# Patient Record
Sex: MALE | Race: WHITE | Employment: UNEMPLOYED | URBAN - METROPOLITAN AREA
[De-identification: names, ages, dates, MRNs, and addresses within clinical notes are randomized per-mention and may not be internally consistent; named-entity substitution may affect disease eponyms.]

---

## 2017-01-09 ENCOUNTER — OFFICE VISIT (OUTPATIENT)
Dept: FAMILY MEDICINE CLINIC | Age: 62
End: 2017-01-09

## 2017-01-09 VITALS
OXYGEN SATURATION: 99 % | HEIGHT: 69 IN | RESPIRATION RATE: 16 BRPM | WEIGHT: 188.6 LBS | BODY MASS INDEX: 27.93 KG/M2 | SYSTOLIC BLOOD PRESSURE: 100 MMHG | TEMPERATURE: 97.3 F | HEART RATE: 72 BPM | DIASTOLIC BLOOD PRESSURE: 28 MMHG

## 2017-01-09 DIAGNOSIS — I10 ESSENTIAL HYPERTENSION: ICD-10-CM

## 2017-01-09 DIAGNOSIS — M54.42 CHRONIC MIDLINE LOW BACK PAIN WITH LEFT-SIDED SCIATICA: ICD-10-CM

## 2017-01-09 DIAGNOSIS — E78.2 MIXED HYPERLIPIDEMIA: ICD-10-CM

## 2017-01-09 DIAGNOSIS — E11.42 DIABETIC POLYNEUROPATHY ASSOCIATED WITH TYPE 2 DIABETES MELLITUS (HCC): ICD-10-CM

## 2017-01-09 DIAGNOSIS — E11.65 TYPE 2 DIABETES MELLITUS WITH HYPERGLYCEMIA, WITH LONG-TERM CURRENT USE OF INSULIN (HCC): ICD-10-CM

## 2017-01-09 DIAGNOSIS — Z95.5 PRESENCE OF STENT IN CORONARY ARTERY: ICD-10-CM

## 2017-01-09 DIAGNOSIS — E78.5 HYPERLIPIDEMIA, UNSPECIFIED HYPERLIPIDEMIA TYPE: ICD-10-CM

## 2017-01-09 DIAGNOSIS — B18.1 CHRONIC VIRAL HEPATITIS B WITHOUT DELTA AGENT AND WITHOUT COMA (HCC): ICD-10-CM

## 2017-01-09 DIAGNOSIS — G89.29 CHRONIC MIDLINE LOW BACK PAIN WITH LEFT-SIDED SCIATICA: ICD-10-CM

## 2017-01-09 DIAGNOSIS — I25.10 CORONARY ARTERY DISEASE INVOLVING NATIVE CORONARY ARTERY OF NATIVE HEART WITHOUT ANGINA PECTORIS: Primary | ICD-10-CM

## 2017-01-09 DIAGNOSIS — F41.9 ANXIETY: ICD-10-CM

## 2017-01-09 DIAGNOSIS — K21.9 GASTROESOPHAGEAL REFLUX DISEASE, ESOPHAGITIS PRESENCE NOT SPECIFIED: ICD-10-CM

## 2017-01-09 DIAGNOSIS — Z79.4 TYPE 2 DIABETES MELLITUS WITH HYPERGLYCEMIA, WITH LONG-TERM CURRENT USE OF INSULIN (HCC): ICD-10-CM

## 2017-01-09 LAB
ALBUMIN SERPL-MCNC: 4.5 G/DL (ref 3.9–4.9)
ALP BLD-CCNC: 54 U/L (ref 35–104)
ALT SERPL-CCNC: 31 U/L (ref 0–41)
ANION GAP SERPL CALCULATED.3IONS-SCNC: 11 MEQ/L (ref 7–13)
AST SERPL-CCNC: 19 U/L (ref 0–40)
BASOPHILS ABSOLUTE: 0 K/UL (ref 0–0.2)
BASOPHILS RELATIVE PERCENT: 0.3 %
BILIRUB SERPL-MCNC: 0.7 MG/DL (ref 0–1.2)
BUN BLDV-MCNC: 22 MG/DL (ref 6–20)
CALCIUM SERPL-MCNC: 9.8 MG/DL (ref 8.6–10.2)
CHLORIDE BLD-SCNC: 97 MEQ/L (ref 98–107)
CHOLESTEROL, TOTAL: 148 MG/DL (ref 0–199)
CO2: 27 MEQ/L (ref 22–29)
CREAT SERPL-MCNC: 0.95 MG/DL (ref 0.7–1.2)
EOSINOPHILS ABSOLUTE: 0.1 K/UL (ref 0–0.7)
EOSINOPHILS RELATIVE PERCENT: 1.4 %
GFR AFRICAN AMERICAN: >60
GFR NON-AFRICAN AMERICAN: >60
GLOBULIN: 2.5 G/DL (ref 2.3–3.5)
GLUCOSE BLD-MCNC: 267 MG/DL (ref 74–109)
HBA1C MFR BLD: 8.5 % (ref 4.8–5.9)
HBV SURFACE AB TITR SER: REACTIVE MIU/ML
HCT VFR BLD CALC: 46.4 % (ref 42–52)
HDLC SERPL-MCNC: 39 MG/DL (ref 40–59)
HEMOGLOBIN: 15.6 G/DL (ref 14–18)
LDL CHOLESTEROL CALCULATED: 62 MG/DL (ref 0–129)
LYMPHOCYTES ABSOLUTE: 1.6 K/UL (ref 1–4.8)
LYMPHOCYTES RELATIVE PERCENT: 16.6 %
MCH RBC QN AUTO: 32.3 PG (ref 27–31.3)
MCHC RBC AUTO-ENTMCNC: 33.7 % (ref 33–37)
MCV RBC AUTO: 95.9 FL (ref 80–100)
MONOCYTES ABSOLUTE: 0.7 K/UL (ref 0.2–0.8)
MONOCYTES RELATIVE PERCENT: 7.8 %
NEUTROPHILS ABSOLUTE: 6.9 K/UL (ref 1.4–6.5)
NEUTROPHILS RELATIVE PERCENT: 73.9 %
PDW BLD-RTO: 13.3 % (ref 11.5–14.5)
PLATELET # BLD: 182 K/UL (ref 130–400)
POTASSIUM SERPL-SCNC: 5.2 MEQ/L (ref 3.5–5.1)
RBC # BLD: 4.84 M/UL (ref 4.7–6.1)
SODIUM BLD-SCNC: 135 MEQ/L (ref 132–144)
TOTAL PROTEIN: 7 G/DL (ref 6.4–8.1)
TRIGL SERPL-MCNC: 233 MG/DL (ref 0–200)
WBC # BLD: 9.4 K/UL (ref 4.8–10.8)

## 2017-01-09 PROCEDURE — 99214 OFFICE O/P EST MOD 30 MIN: CPT | Performed by: FAMILY MEDICINE

## 2017-01-09 RX ORDER — GLIPIZIDE 10 MG/1
10 TABLET ORAL
Qty: 60 TABLET | Refills: 5 | Status: SHIPPED | OUTPATIENT
Start: 2017-01-09 | End: 2017-05-03 | Stop reason: SDUPTHER

## 2017-01-09 RX ORDER — GLUCOSAMINE HCL/CHONDROITIN SU 500-400 MG
CAPSULE ORAL
Qty: 100 STRIP | Refills: 5 | Status: SHIPPED | OUTPATIENT
Start: 2017-01-09 | End: 2017-08-24 | Stop reason: SDUPTHER

## 2017-01-09 RX ORDER — LANCETS 30 GAUGE
EACH MISCELLANEOUS
Qty: 100 EACH | Refills: 5 | Status: SHIPPED | OUTPATIENT
Start: 2017-01-09 | End: 2018-11-08

## 2017-01-09 RX ORDER — METOPROLOL SUCCINATE 25 MG/1
12.5 TABLET, EXTENDED RELEASE ORAL DAILY
Qty: 30 TABLET | Refills: 5 | Status: SHIPPED | OUTPATIENT
Start: 2017-01-09 | End: 2018-09-13 | Stop reason: DRUGHIGH

## 2017-01-09 RX ORDER — RANOLAZINE 500 MG/1
1000 TABLET, EXTENDED RELEASE ORAL 2 TIMES DAILY
Qty: 60 TABLET | Refills: 5 | Status: SHIPPED | OUTPATIENT
Start: 2017-01-09 | End: 2017-01-12 | Stop reason: DRUGHIGH

## 2017-01-09 RX ORDER — PRASUGREL 10 MG/1
10 TABLET, FILM COATED ORAL ONCE
Qty: 30 TABLET | Refills: 5 | Status: SHIPPED | OUTPATIENT
Start: 2017-01-09 | End: 2018-09-13 | Stop reason: ALTCHOICE

## 2017-01-09 RX ORDER — CANAGLIFLOZIN 100 MG/1
100 TABLET, FILM COATED ORAL
Qty: 30 TABLET | Refills: 11 | Status: SHIPPED | OUTPATIENT
Start: 2017-01-09 | End: 2017-04-11 | Stop reason: CLARIF

## 2017-01-09 RX ORDER — PANTOPRAZOLE SODIUM 20 MG/1
20 TABLET, DELAYED RELEASE ORAL DAILY
Qty: 30 TABLET | Refills: 5 | Status: SHIPPED | OUTPATIENT
Start: 2017-01-09 | End: 2017-05-03 | Stop reason: SDUPTHER

## 2017-01-09 RX ORDER — GABAPENTIN 300 MG/1
300 CAPSULE ORAL 3 TIMES DAILY
Qty: 90 CAPSULE | Refills: 5 | Status: SHIPPED | OUTPATIENT
Start: 2017-01-09 | End: 2017-05-03 | Stop reason: SDUPTHER

## 2017-01-09 RX ORDER — CANAGLIFLOZIN 100 MG/1
TABLET, FILM COATED ORAL
Refills: 11 | COMMUNITY
Start: 2016-11-06 | End: 2017-01-09 | Stop reason: SDUPTHER

## 2017-01-09 RX ORDER — ATORVASTATIN CALCIUM 80 MG/1
80 TABLET, FILM COATED ORAL DAILY
Qty: 30 TABLET | Refills: 5 | Status: SHIPPED | OUTPATIENT
Start: 2017-01-09 | End: 2017-05-03 | Stop reason: SDUPTHER

## 2017-01-09 RX ORDER — RANOLAZINE 500 MG/1
1000 TABLET, EXTENDED RELEASE ORAL 2 TIMES DAILY
Qty: 60 TABLET | Refills: 5 | Status: SHIPPED | OUTPATIENT
Start: 2017-01-09 | End: 2017-01-09 | Stop reason: SDUPTHER

## 2017-01-09 RX ORDER — RANOLAZINE 500 MG/1
1000 TABLET, EXTENDED RELEASE ORAL 2 TIMES DAILY
Qty: 120 TABLET | Refills: 5 | Status: CANCELLED | OUTPATIENT
Start: 2017-01-09

## 2017-01-09 RX ORDER — SERTRALINE HYDROCHLORIDE 100 MG/1
100 TABLET, FILM COATED ORAL DAILY
Qty: 30 TABLET | Refills: 5 | Status: SHIPPED | OUTPATIENT
Start: 2017-01-09 | End: 2017-05-03 | Stop reason: SDUPTHER

## 2017-01-09 ASSESSMENT — PATIENT HEALTH QUESTIONNAIRE - PHQ9
2. FEELING DOWN, DEPRESSED OR HOPELESS: 1
1. LITTLE INTEREST OR PLEASURE IN DOING THINGS: 0
SUM OF ALL RESPONSES TO PHQ9 QUESTIONS 1 & 2: 1
SUM OF ALL RESPONSES TO PHQ QUESTIONS 1-9: 1

## 2017-01-11 LAB — HEPATITIS B SURFACE ANTIGEN CONFIRMATION: NORMAL

## 2017-01-12 ENCOUNTER — TELEPHONE (OUTPATIENT)
Dept: FAMILY MEDICINE CLINIC | Age: 62
End: 2017-01-12

## 2017-01-12 RX ORDER — RANOLAZINE 1000 MG/1
1000 TABLET, EXTENDED RELEASE ORAL 2 TIMES DAILY
Qty: 60 TABLET | Refills: 5 | OUTPATIENT
Start: 2017-01-12 | End: 2018-11-08 | Stop reason: SDUPTHER

## 2017-02-20 DIAGNOSIS — M54.42 MIDLINE LOW BACK PAIN WITH LEFT-SIDED SCIATICA, UNSPECIFIED CHRONICITY: ICD-10-CM

## 2017-02-20 RX ORDER — HYDROCODONE BITARTRATE AND ACETAMINOPHEN 5; 325 MG/1; MG/1
1 TABLET ORAL 2 TIMES DAILY PRN
Qty: 60 TABLET | Refills: 0 | Status: SHIPPED | OUTPATIENT
Start: 2017-02-20 | End: 2017-04-11 | Stop reason: SDUPTHER

## 2017-02-24 ENCOUNTER — TELEPHONE (OUTPATIENT)
Dept: FAMILY MEDICINE CLINIC | Age: 62
End: 2017-02-24

## 2017-03-29 ENCOUNTER — TELEPHONE (OUTPATIENT)
Dept: FAMILY MEDICINE CLINIC | Age: 62
End: 2017-03-29

## 2017-04-11 ENCOUNTER — OFFICE VISIT (OUTPATIENT)
Dept: FAMILY MEDICINE CLINIC | Age: 62
End: 2017-04-11

## 2017-04-11 VITALS
RESPIRATION RATE: 16 BRPM | HEART RATE: 72 BPM | TEMPERATURE: 97.6 F | HEIGHT: 69 IN | DIASTOLIC BLOOD PRESSURE: 78 MMHG | WEIGHT: 187 LBS | SYSTOLIC BLOOD PRESSURE: 118 MMHG | BODY MASS INDEX: 27.7 KG/M2

## 2017-04-11 DIAGNOSIS — E11.42 DIABETIC POLYNEUROPATHY ASSOCIATED WITH TYPE 2 DIABETES MELLITUS (HCC): ICD-10-CM

## 2017-04-11 DIAGNOSIS — Z12.11 SCREEN FOR COLON CANCER: ICD-10-CM

## 2017-04-11 DIAGNOSIS — E78.5 HYPERLIPIDEMIA, UNSPECIFIED HYPERLIPIDEMIA TYPE: ICD-10-CM

## 2017-04-11 DIAGNOSIS — M54.42 MIDLINE LOW BACK PAIN WITH LEFT-SIDED SCIATICA, UNSPECIFIED CHRONICITY: ICD-10-CM

## 2017-04-11 DIAGNOSIS — I10 ESSENTIAL HYPERTENSION: ICD-10-CM

## 2017-04-11 DIAGNOSIS — E78.2 MIXED HYPERLIPIDEMIA: ICD-10-CM

## 2017-04-11 LAB
ALBUMIN SERPL-MCNC: 4.1 G/DL (ref 3.9–4.9)
ALP BLD-CCNC: 56 U/L (ref 35–104)
ALT SERPL-CCNC: 26 U/L (ref 0–41)
ANION GAP SERPL CALCULATED.3IONS-SCNC: 13 MEQ/L (ref 7–13)
AST SERPL-CCNC: 20 U/L (ref 0–40)
BASOPHILS ABSOLUTE: 0 K/UL (ref 0–0.2)
BASOPHILS RELATIVE PERCENT: 0.3 %
BILIRUB SERPL-MCNC: 0.6 MG/DL (ref 0–1.2)
BUN BLDV-MCNC: 32 MG/DL (ref 6–20)
CALCIUM SERPL-MCNC: 9.7 MG/DL (ref 8.6–10.2)
CHLORIDE BLD-SCNC: 99 MEQ/L (ref 98–107)
CHOLESTEROL, TOTAL: 127 MG/DL (ref 0–199)
CO2: 26 MEQ/L (ref 22–29)
CREAT SERPL-MCNC: 1.1 MG/DL (ref 0.7–1.2)
CREATININE URINE: 91.3 MG/DL
EOSINOPHILS ABSOLUTE: 0.2 K/UL (ref 0–0.7)
EOSINOPHILS RELATIVE PERCENT: 1.5 %
GFR AFRICAN AMERICAN: >60
GFR NON-AFRICAN AMERICAN: >60
GLOBULIN: 3 G/DL (ref 2.3–3.5)
GLUCOSE BLD-MCNC: 155 MG/DL (ref 74–109)
HBA1C MFR BLD: 7.9 % (ref 4.8–5.9)
HCT VFR BLD CALC: 45.2 % (ref 42–52)
HDLC SERPL-MCNC: 32 MG/DL (ref 40–59)
HEMOGLOBIN: 15.4 G/DL (ref 14–18)
LDL CHOLESTEROL CALCULATED: 50 MG/DL (ref 0–129)
LYMPHOCYTES ABSOLUTE: 2.3 K/UL (ref 1–4.8)
LYMPHOCYTES RELATIVE PERCENT: 22.3 %
MCH RBC QN AUTO: 32 PG (ref 27–31.3)
MCHC RBC AUTO-ENTMCNC: 34.1 % (ref 33–37)
MCV RBC AUTO: 94 FL (ref 80–100)
MICROALBUMIN UR-MCNC: 15.7 MG/DL
MICROALBUMIN/CREAT UR-RTO: 172 MG/G (ref 0–30)
MONOCYTES ABSOLUTE: 1.1 K/UL (ref 0.2–0.8)
MONOCYTES RELATIVE PERCENT: 10.1 %
NEUTROPHILS ABSOLUTE: 6.9 K/UL (ref 1.4–6.5)
NEUTROPHILS RELATIVE PERCENT: 65.8 %
PDW BLD-RTO: 13.1 % (ref 11.5–14.5)
PLATELET # BLD: 190 K/UL (ref 130–400)
POTASSIUM SERPL-SCNC: 5.2 MEQ/L (ref 3.5–5.1)
RBC # BLD: 4.8 M/UL (ref 4.7–6.1)
SODIUM BLD-SCNC: 138 MEQ/L (ref 132–144)
TOTAL PROTEIN: 7.1 G/DL (ref 6.4–8.1)
TRIGL SERPL-MCNC: 223 MG/DL (ref 0–200)
WBC # BLD: 10.5 K/UL (ref 4.8–10.8)

## 2017-04-11 PROCEDURE — 99214 OFFICE O/P EST MOD 30 MIN: CPT | Performed by: FAMILY MEDICINE

## 2017-04-11 RX ORDER — HYDROCODONE BITARTRATE AND ACETAMINOPHEN 5; 325 MG/1; MG/1
1 TABLET ORAL 2 TIMES DAILY PRN
Qty: 60 TABLET | Refills: 0 | Status: CANCELLED | OUTPATIENT
Start: 2017-04-11

## 2017-04-11 RX ORDER — HYDROCODONE BITARTRATE AND ACETAMINOPHEN 5; 325 MG/1; MG/1
1 TABLET ORAL 2 TIMES DAILY PRN
Qty: 180 TABLET | Refills: 0 | Status: SHIPPED | OUTPATIENT
Start: 2017-04-11 | End: 2018-05-03 | Stop reason: SDUPTHER

## 2017-04-18 ASSESSMENT — ENCOUNTER SYMPTOMS
TROUBLE SWALLOWING: 0
BLOOD IN STOOL: 0
VOMITING: 0
SHORTNESS OF BREATH: 0
CHEST TIGHTNESS: 0
ABDOMINAL PAIN: 0
COUGH: 0
NAUSEA: 0
CONSTIPATION: 0
DIARRHEA: 0

## 2017-05-03 DIAGNOSIS — F41.9 ANXIETY: ICD-10-CM

## 2017-05-03 DIAGNOSIS — M54.42 CHRONIC MIDLINE LOW BACK PAIN WITH LEFT-SIDED SCIATICA: ICD-10-CM

## 2017-05-03 DIAGNOSIS — E78.2 MIXED HYPERLIPIDEMIA: ICD-10-CM

## 2017-05-03 DIAGNOSIS — G89.29 CHRONIC MIDLINE LOW BACK PAIN WITH LEFT-SIDED SCIATICA: ICD-10-CM

## 2017-05-03 DIAGNOSIS — K21.9 GASTROESOPHAGEAL REFLUX DISEASE, ESOPHAGITIS PRESENCE NOT SPECIFIED: ICD-10-CM

## 2017-05-03 RX ORDER — SERTRALINE HYDROCHLORIDE 100 MG/1
TABLET, FILM COATED ORAL
Qty: 30 TABLET | Refills: 5 | Status: SHIPPED | OUTPATIENT
Start: 2017-05-03 | End: 2017-10-30 | Stop reason: SDUPTHER

## 2017-05-03 RX ORDER — GLIPIZIDE 10 MG/1
TABLET ORAL
Qty: 60 TABLET | Refills: 5 | Status: SHIPPED | OUTPATIENT
Start: 2017-05-03 | End: 2017-10-30 | Stop reason: SDUPTHER

## 2017-05-03 RX ORDER — ATORVASTATIN CALCIUM 80 MG/1
TABLET, FILM COATED ORAL
Qty: 30 TABLET | Refills: 5 | Status: SHIPPED | OUTPATIENT
Start: 2017-05-03 | End: 2017-10-30 | Stop reason: SDUPTHER

## 2017-05-03 RX ORDER — GABAPENTIN 300 MG/1
CAPSULE ORAL
Qty: 90 CAPSULE | Refills: 5 | Status: SHIPPED | OUTPATIENT
Start: 2017-05-03 | End: 2017-10-30 | Stop reason: SDUPTHER

## 2017-05-03 RX ORDER — PANTOPRAZOLE SODIUM 20 MG/1
TABLET, DELAYED RELEASE ORAL
Qty: 30 TABLET | Refills: 5 | Status: SHIPPED | OUTPATIENT
Start: 2017-05-03 | End: 2017-10-30 | Stop reason: SDUPTHER

## 2017-08-07 ENCOUNTER — OFFICE VISIT (OUTPATIENT)
Dept: FAMILY MEDICINE CLINIC | Age: 62
End: 2017-08-07

## 2017-08-07 VITALS
HEART RATE: 64 BPM | DIASTOLIC BLOOD PRESSURE: 62 MMHG | BODY MASS INDEX: 26.91 KG/M2 | TEMPERATURE: 97.8 F | SYSTOLIC BLOOD PRESSURE: 115 MMHG | RESPIRATION RATE: 16 BRPM | WEIGHT: 179.6 LBS | OXYGEN SATURATION: 97 %

## 2017-08-07 DIAGNOSIS — M54.42 MIDLINE LOW BACK PAIN WITH LEFT-SIDED SCIATICA, UNSPECIFIED CHRONICITY: ICD-10-CM

## 2017-08-07 DIAGNOSIS — B37.49 CANDIDA INFECTION OF GENITAL REGION: Primary | ICD-10-CM

## 2017-08-07 PROCEDURE — 99213 OFFICE O/P EST LOW 20 MIN: CPT | Performed by: NURSE PRACTITIONER

## 2017-08-07 RX ORDER — NYSTATIN 100000 U/G
OINTMENT TOPICAL
Qty: 30 G | Refills: 1 | Status: SHIPPED | OUTPATIENT
Start: 2017-08-07 | End: 2017-10-25 | Stop reason: ALTCHOICE

## 2017-08-07 ASSESSMENT — ENCOUNTER SYMPTOMS: ABDOMINAL PAIN: 0

## 2017-08-08 RX ORDER — HYDROCODONE BITARTRATE AND ACETAMINOPHEN 5; 325 MG/1; MG/1
1 TABLET ORAL 2 TIMES DAILY PRN
Qty: 180 TABLET | Refills: 0 | OUTPATIENT
Start: 2017-08-08

## 2017-08-24 ENCOUNTER — OFFICE VISIT (OUTPATIENT)
Dept: FAMILY MEDICINE CLINIC | Age: 62
End: 2017-08-24

## 2017-08-24 VITALS
TEMPERATURE: 98 F | RESPIRATION RATE: 16 BRPM | SYSTOLIC BLOOD PRESSURE: 117 MMHG | WEIGHT: 177.4 LBS | DIASTOLIC BLOOD PRESSURE: 64 MMHG | BODY MASS INDEX: 26.58 KG/M2 | OXYGEN SATURATION: 97 % | HEART RATE: 79 BPM

## 2017-08-24 DIAGNOSIS — E78.2 MIXED HYPERLIPIDEMIA: ICD-10-CM

## 2017-08-24 DIAGNOSIS — H81.10 BPPV (BENIGN PAROXYSMAL POSITIONAL VERTIGO), UNSPECIFIED LATERALITY: Primary | ICD-10-CM

## 2017-08-24 PROCEDURE — 99213 OFFICE O/P EST LOW 20 MIN: CPT | Performed by: NURSE PRACTITIONER

## 2017-08-24 RX ORDER — GLUCOSAMINE HCL/CHONDROITIN SU 500-400 MG
CAPSULE ORAL
Qty: 100 STRIP | Refills: 5 | Status: SHIPPED | OUTPATIENT
Start: 2017-08-24 | End: 2018-05-03 | Stop reason: SDUPTHER

## 2017-08-26 DIAGNOSIS — H81.10 BPPV (BENIGN PAROXYSMAL POSITIONAL VERTIGO), UNSPECIFIED LATERALITY: ICD-10-CM

## 2017-08-26 DIAGNOSIS — E78.2 MIXED HYPERLIPIDEMIA: ICD-10-CM

## 2017-08-26 LAB
ALBUMIN SERPL-MCNC: 4.1 G/DL (ref 3.9–4.9)
ALP BLD-CCNC: 59 U/L (ref 35–104)
ALT SERPL-CCNC: 18 U/L (ref 0–41)
ANION GAP SERPL CALCULATED.3IONS-SCNC: 17 MEQ/L (ref 7–13)
AST SERPL-CCNC: 16 U/L (ref 0–40)
BASOPHILS ABSOLUTE: 0 K/UL (ref 0–0.2)
BASOPHILS RELATIVE PERCENT: 0.6 %
BILIRUB SERPL-MCNC: 0.5 MG/DL (ref 0–1.2)
BUN BLDV-MCNC: 17 MG/DL (ref 8–23)
CALCIUM SERPL-MCNC: 9.5 MG/DL (ref 8.6–10.2)
CHLORIDE BLD-SCNC: 98 MEQ/L (ref 98–107)
CHOLESTEROL, TOTAL: 193 MG/DL (ref 0–199)
CO2: 25 MEQ/L (ref 22–29)
CREAT SERPL-MCNC: 0.66 MG/DL (ref 0.7–1.2)
EOSINOPHILS ABSOLUTE: 0.2 K/UL (ref 0–0.7)
EOSINOPHILS RELATIVE PERCENT: 1.9 %
GFR AFRICAN AMERICAN: >60
GFR NON-AFRICAN AMERICAN: >60
GLOBULIN: 2.6 G/DL (ref 2.3–3.5)
GLUCOSE BLD-MCNC: 224 MG/DL (ref 74–109)
HBA1C MFR BLD: 12.3 % (ref 4.8–5.9)
HCT VFR BLD CALC: 47.5 % (ref 42–52)
HDLC SERPL-MCNC: 38 MG/DL (ref 40–59)
HEMOGLOBIN: 15.9 G/DL (ref 14–18)
LDL CHOLESTEROL CALCULATED: 82 MG/DL (ref 0–129)
LYMPHOCYTES ABSOLUTE: 1.6 K/UL (ref 1–4.8)
LYMPHOCYTES RELATIVE PERCENT: 19.3 %
MCH RBC QN AUTO: 31.5 PG (ref 27–31.3)
MCHC RBC AUTO-ENTMCNC: 33.4 % (ref 33–37)
MCV RBC AUTO: 94.4 FL (ref 80–100)
MONOCYTES ABSOLUTE: 0.6 K/UL (ref 0.2–0.8)
MONOCYTES RELATIVE PERCENT: 7.8 %
NEUTROPHILS ABSOLUTE: 5.9 K/UL (ref 1.4–6.5)
NEUTROPHILS RELATIVE PERCENT: 70.4 %
PDW BLD-RTO: 12.9 % (ref 11.5–14.5)
PLATELET # BLD: 198 K/UL (ref 130–400)
POTASSIUM SERPL-SCNC: 5.4 MEQ/L (ref 3.5–5.1)
RBC # BLD: 5.03 M/UL (ref 4.7–6.1)
SODIUM BLD-SCNC: 140 MEQ/L (ref 132–144)
TOTAL PROTEIN: 6.7 G/DL (ref 6.4–8.1)
TRIGL SERPL-MCNC: 367 MG/DL (ref 0–200)
WBC # BLD: 8.3 K/UL (ref 4.8–10.8)

## 2017-08-28 DIAGNOSIS — E87.5 HYPERKALEMIA: Primary | ICD-10-CM

## 2017-09-02 DIAGNOSIS — E11.65 TYPE 2 DIABETES MELLITUS WITH HYPERGLYCEMIA, WITH LONG-TERM CURRENT USE OF INSULIN (HCC): ICD-10-CM

## 2017-09-02 DIAGNOSIS — Z79.4 TYPE 2 DIABETES MELLITUS WITH HYPERGLYCEMIA, WITH LONG-TERM CURRENT USE OF INSULIN (HCC): ICD-10-CM

## 2017-09-02 RX ORDER — INSULIN GLARGINE 100 [IU]/ML
INJECTION, SOLUTION SUBCUTANEOUS
Qty: 45 PEN | Refills: 3 | Status: SHIPPED | OUTPATIENT
Start: 2017-09-02 | End: 2018-02-20 | Stop reason: SDUPTHER

## 2017-10-05 ENCOUNTER — TELEPHONE (OUTPATIENT)
Dept: FAMILY MEDICINE CLINIC | Age: 62
End: 2017-10-05

## 2017-10-06 NOTE — TELEPHONE ENCOUNTER
PA HAS BEEN REQUESTED FOR lantus. PA HAS BEEN SUBMITTED VIA cover my meds thru [facility] AND WE ARE AWAITING A REPLY VIA FAX.

## 2017-10-25 ENCOUNTER — OFFICE VISIT (OUTPATIENT)
Dept: FAMILY MEDICINE CLINIC | Age: 62
End: 2017-10-25

## 2017-10-25 VITALS
BODY MASS INDEX: 28.12 KG/M2 | RESPIRATION RATE: 16 BRPM | HEART RATE: 68 BPM | HEIGHT: 67 IN | SYSTOLIC BLOOD PRESSURE: 115 MMHG | TEMPERATURE: 98.1 F | WEIGHT: 179.2 LBS | OXYGEN SATURATION: 97 % | DIASTOLIC BLOOD PRESSURE: 68 MMHG

## 2017-10-25 DIAGNOSIS — B37.49 CANDIDA INFECTION OF GENITAL REGION: Primary | ICD-10-CM

## 2017-10-25 PROBLEM — R30.0 DYSURIA: Status: ACTIVE | Noted: 2017-10-25

## 2017-10-25 LAB
BILIRUBIN, POC: NEGATIVE
BLOOD URINE, POC: NEGATIVE
CLARITY, POC: ABNORMAL
COLOR, POC: ABNORMAL
GLUCOSE URINE, POC: ABNORMAL
KETONES, POC: NEGATIVE
LEUKOCYTE EST, POC: NEGATIVE
NITRITE, POC: NEGATIVE
PH, POC: 5.5
PROTEIN, POC: ABNORMAL
SPECIFIC GRAVITY, POC: 1.03
UROBILINOGEN, POC: ABNORMAL

## 2017-10-25 PROCEDURE — G8427 DOCREV CUR MEDS BY ELIG CLIN: HCPCS | Performed by: NURSE PRACTITIONER

## 2017-10-25 PROCEDURE — G8417 CALC BMI ABV UP PARAM F/U: HCPCS | Performed by: NURSE PRACTITIONER

## 2017-10-25 PROCEDURE — G8598 ASA/ANTIPLAT THER USED: HCPCS | Performed by: NURSE PRACTITIONER

## 2017-10-25 PROCEDURE — 99213 OFFICE O/P EST LOW 20 MIN: CPT | Performed by: NURSE PRACTITIONER

## 2017-10-25 PROCEDURE — 81003 URINALYSIS AUTO W/O SCOPE: CPT | Performed by: NURSE PRACTITIONER

## 2017-10-25 PROCEDURE — 3017F COLORECTAL CA SCREEN DOC REV: CPT | Performed by: NURSE PRACTITIONER

## 2017-10-25 PROCEDURE — G8484 FLU IMMUNIZE NO ADMIN: HCPCS | Performed by: NURSE PRACTITIONER

## 2017-10-25 PROCEDURE — 1036F TOBACCO NON-USER: CPT | Performed by: NURSE PRACTITIONER

## 2017-10-25 RX ORDER — FLUCONAZOLE 150 MG/1
150 TABLET ORAL DAILY
Qty: 7 TABLET | Refills: 0 | Status: SHIPPED | OUTPATIENT
Start: 2017-10-25 | End: 2018-04-11 | Stop reason: ALTCHOICE

## 2017-10-25 RX ORDER — NYSTATIN 100000 [USP'U]/G
POWDER TOPICAL
Qty: 1 BOTTLE | Refills: 1 | Status: SHIPPED | OUTPATIENT
Start: 2017-10-25 | End: 2018-04-11 | Stop reason: ALTCHOICE

## 2017-10-25 ASSESSMENT — ENCOUNTER SYMPTOMS
SHORTNESS OF BREATH: 0
VOMITING: 0
DIARRHEA: 0
CONSTIPATION: 0
NAUSEA: 0
ABDOMINAL PAIN: 0

## 2017-10-26 DIAGNOSIS — B37.49 CANDIDA INFECTION OF GENITAL REGION: ICD-10-CM

## 2017-10-26 NOTE — PROGRESS NOTES
Subjective  Latosha Murguia, 63 y.o. male presents today with:  Chief Complaint   Patient presents with    Urinary Tract Infection     Pt c/o burning, itching, pain, white discharge, seen previously [id number], has not gone away       Penile Discharge   The patient's primary symptoms include genital itching and penile discharge. The patient's pertinent negatives include no genital injury, genital lesions, pelvic pain, penile pain, priapism, scrotal swelling or testicular pain. This is a recurrent problem. The current episode started more than 1 month ago. The problem occurs daily. The problem has been waxing and waning. The pain is mild. Pertinent negatives include no abdominal pain, chest pain, chills, constipation, diarrhea, discolored urine, dysuria, fever, flank pain, frequency, hematuria, nausea, shortness of breath, urgency, urinary retention or vomiting. The penile discharge was white. The genital lesion's characteristics include rash and redness. The symptoms are aggravated by tactile pressure. Treatments tried: ointments prescribed 2 months ago. The treatment provided mild relief. He is not sexually active (has not engaged in sexual intercourse in over 2 years). Past Medical History:   Diagnosis Date    Anxiety     CAD (coronary artery disease)     Depression     Essential hypertension 10/13/2015    Gastroesophageal reflux disease 9/30/2015    Hyperlipidemia     Sciatica 1/27/2012    Type II or unspecified type diabetes mellitus without mention of complication, not stated as uncontrolled        No Known Allergies  Current Outpatient Prescriptions   Medication Sig Dispense Refill    fluconazole (DIFLUCAN) 150 MG tablet Take 1 tablet by mouth daily 7 tablet 0    nystatin (MYCOSTATIN) 946702 UNIT/GM powder Apply 3 times daily.  1 Bottle 1    LANTUS SOLOSTAR 100 UNIT/ML injection pen 50 UNITS IN AM AND 45 UNITS IN PM 45 Pen 3    Blood Glucose Monitoring Suppl SAMEER 1 Device by Does not apply route daily 1 Device 0    Insulin Pen Needle ([employer] PEN NEEDLES 29G) 29G X 12MM MISC 1 each by Does not apply route daily 100 each 5    Glucose Blood (BLOOD GLUCOSE TEST STRIPS) STRP Test blood sugar twice daily 100 strip 5    metFORMIN (GLUCOPHAGE) 1000 MG tablet Take 1 tablet by mouth 2 times daily (with meals) 180 tablet 3    pantoprazole (PROTONIX) 20 MG tablet TAKE 1 TABLET EVERY DAY IN THE AM 30 tablet 5    gabapentin (NEURONTIN) 300 MG capsule TAKE ONE CAPSULE 3 TIMES A DAY 90 capsule 5    atorvastatin (LIPITOR) 80 MG tablet TAKE 1 TABLET EVERY DAY 30 tablet 5    sertraline (ZOLOFT) 100 MG tablet TAKE 1 TABLET EVERY DAY 30 tablet 5    glipiZIDE (GLUCOTROL) 10 MG tablet TAKE 1 TABLET TWICE A DAY BEFORE MEALS 60 tablet 5    HYDROcodone-acetaminophen (NORCO) 5-325 MG per tablet Take 1 tablet by mouth 2 times daily as needed for Pain . 180 tablet 0    ranolazine (RANEXA) 1000 MG extended release tablet Take 1 tablet by mouth 2 times daily 60 tablet 5    Lancets MISC Twice daily testing 100 each 5    metoprolol succinate (TOPROL XL) 25 MG extended release tablet Take 0.5 tablets by mouth daily 30 tablet 5    butenafine (MENTAX) 1 % CREA Apply twice a day as needed 15 g 5    aspirin 81 MG tablet Take 81 mg by mouth daily      empagliflozin (JARDIANCE) 25 MG tablet Take 25 mg by mouth daily 30 tablet 5    prasugrel (EFFIENT) 10 MG TABS Take 1 tablet by mouth once for 1 dose 30 tablet 5     No current facility-administered medications for this visit. Review of Systems   Constitutional: Negative for chills and fever. Respiratory: Negative for shortness of breath. Cardiovascular: Negative for chest pain. Gastrointestinal: Negative for abdominal pain, constipation, diarrhea, nausea and vomiting. Genitourinary: Positive for discharge and genital sores.  Negative for decreased urine volume, difficulty urinating, dysuria, enuresis, flank pain, frequency, hematuria, pelvic pain, penile pain, Cloudy     Glucose, UA POC 60 mmol/L     Bilirubin, UA Negative     Ketones, UA Negative     Spec Grav, UA 1.030     Blood, UA POC Negative     pH, UA 5.5     Protein, UA POC 1.0 g/L     Urobilinogen, UA 3.5 umol/L     Leukocytes, UA Negative     Nitrite, UA Negative      Instructed patient to gently retract foreskin and clean head of penis and foreskin with mild soap and water. Gently pat dry. Apply mycostatin powder twice a day. Take Diflucan daily. Will call with the results of culture. Return for follow up with PCP. Reviewed with the patient: current clinical status, medications, activities and diet. Side effects, adverse effects of the medication prescribed today, as well as treatment plan/ rationale and result expectations have been discussed with the patient who expresses understanding and desires to proceed. Close follow up to evaluate treatment results and for coordination of care. I have reviewed the patient's medical history in detail and updated the computerized patient record.     Bobbi Cheadle, CNP

## 2017-10-28 LAB
ANAEROBIC CULTURE: ABNORMAL
GRAM STAIN RESULT: ABNORMAL
ORGANISM: ABNORMAL
WOUND/ABSCESS: ABNORMAL

## 2017-10-30 DIAGNOSIS — F41.9 ANXIETY: ICD-10-CM

## 2017-10-30 DIAGNOSIS — N48.29 INFECTION OF PENIS: Primary | ICD-10-CM

## 2017-10-30 DIAGNOSIS — M54.42 CHRONIC MIDLINE LOW BACK PAIN WITH LEFT-SIDED SCIATICA: ICD-10-CM

## 2017-10-30 DIAGNOSIS — E78.2 MIXED HYPERLIPIDEMIA: ICD-10-CM

## 2017-10-30 DIAGNOSIS — K21.9 GASTROESOPHAGEAL REFLUX DISEASE, ESOPHAGITIS PRESENCE NOT SPECIFIED: ICD-10-CM

## 2017-10-30 DIAGNOSIS — G89.29 CHRONIC MIDLINE LOW BACK PAIN WITH LEFT-SIDED SCIATICA: ICD-10-CM

## 2017-10-30 RX ORDER — AMOXICILLIN AND CLAVULANATE POTASSIUM 875; 125 MG/1; MG/1
1 TABLET, FILM COATED ORAL 2 TIMES DAILY
Qty: 20 TABLET | Refills: 0 | Status: SHIPPED | OUTPATIENT
Start: 2017-10-30 | End: 2017-11-09

## 2017-10-31 RX ORDER — PANTOPRAZOLE SODIUM 20 MG/1
TABLET, DELAYED RELEASE ORAL
Qty: 30 TABLET | Refills: 5 | Status: SHIPPED | OUTPATIENT
Start: 2017-10-31 | End: 2018-11-08 | Stop reason: SDUPTHER

## 2017-10-31 RX ORDER — SERTRALINE HYDROCHLORIDE 100 MG/1
TABLET, FILM COATED ORAL
Qty: 30 TABLET | Refills: 5 | Status: SHIPPED | OUTPATIENT
Start: 2017-10-31 | End: 2018-11-08 | Stop reason: SDUPTHER

## 2017-10-31 RX ORDER — ATORVASTATIN CALCIUM 80 MG/1
TABLET, FILM COATED ORAL
Qty: 30 TABLET | Refills: 5 | Status: SHIPPED | OUTPATIENT
Start: 2017-10-31 | End: 2018-11-08 | Stop reason: SDUPTHER

## 2017-10-31 RX ORDER — GABAPENTIN 300 MG/1
CAPSULE ORAL
Qty: 90 CAPSULE | Refills: 5 | Status: SHIPPED | OUTPATIENT
Start: 2017-10-31 | End: 2018-11-08 | Stop reason: SDUPTHER

## 2017-10-31 RX ORDER — GLIPIZIDE 10 MG/1
TABLET ORAL
Qty: 60 TABLET | Refills: 5 | Status: SHIPPED | OUTPATIENT
Start: 2017-10-31 | End: 2019-01-29 | Stop reason: SDUPTHER

## 2017-11-04 RX ORDER — EMPAGLIFLOZIN 25 MG/1
TABLET, FILM COATED ORAL
Qty: 30 TABLET | Refills: 0 | Status: SHIPPED | OUTPATIENT
Start: 2017-11-04 | End: 2018-05-02 | Stop reason: ALTCHOICE

## 2018-04-11 ENCOUNTER — OFFICE VISIT (OUTPATIENT)
Dept: FAMILY MEDICINE CLINIC | Age: 63
End: 2018-04-11

## 2018-04-11 VITALS
HEART RATE: 66 BPM | TEMPERATURE: 97.7 F | BODY MASS INDEX: 28.1 KG/M2 | HEIGHT: 68 IN | DIASTOLIC BLOOD PRESSURE: 70 MMHG | SYSTOLIC BLOOD PRESSURE: 130 MMHG | OXYGEN SATURATION: 96 % | WEIGHT: 185.41 LBS | RESPIRATION RATE: 16 BRPM

## 2018-04-11 DIAGNOSIS — Z23 NEED FOR TETANUS BOOSTER: ICD-10-CM

## 2018-04-11 DIAGNOSIS — S61.210A LACERATION OF RIGHT INDEX FINGER WITHOUT FOREIGN BODY WITHOUT DAMAGE TO NAIL, INITIAL ENCOUNTER: Primary | ICD-10-CM

## 2018-04-11 PROCEDURE — 90715 TDAP VACCINE 7 YRS/> IM: CPT | Performed by: NURSE PRACTITIONER

## 2018-04-11 PROCEDURE — 99213 OFFICE O/P EST LOW 20 MIN: CPT | Performed by: NURSE PRACTITIONER

## 2018-04-11 PROCEDURE — 90471 IMMUNIZATION ADMIN: CPT | Performed by: NURSE PRACTITIONER

## 2018-04-11 RX ORDER — CEPHALEXIN 500 MG/1
500 CAPSULE ORAL 4 TIMES DAILY
Qty: 20 CAPSULE | Refills: 0 | Status: SHIPPED | OUTPATIENT
Start: 2018-04-11 | End: 2018-04-16

## 2018-04-11 ASSESSMENT — ENCOUNTER SYMPTOMS
ABDOMINAL DISTENTION: 0
ABDOMINAL PAIN: 0
CONSTIPATION: 0
DIARRHEA: 0
VOMITING: 0
NAUSEA: 0

## 2018-04-11 ASSESSMENT — PATIENT HEALTH QUESTIONNAIRE - PHQ9
SUM OF ALL RESPONSES TO PHQ9 QUESTIONS 1 & 2: 0
1. LITTLE INTEREST OR PLEASURE IN DOING THINGS: 0
2. FEELING DOWN, DEPRESSED OR HOPELESS: 0
SUM OF ALL RESPONSES TO PHQ QUESTIONS 1-9: 0

## 2018-05-02 ENCOUNTER — OFFICE VISIT (OUTPATIENT)
Dept: FAMILY MEDICINE CLINIC | Age: 63
End: 2018-05-02

## 2018-05-02 VITALS
HEIGHT: 68 IN | BODY MASS INDEX: 27.13 KG/M2 | DIASTOLIC BLOOD PRESSURE: 68 MMHG | HEART RATE: 73 BPM | SYSTOLIC BLOOD PRESSURE: 116 MMHG | RESPIRATION RATE: 20 BRPM | OXYGEN SATURATION: 98 % | WEIGHT: 179 LBS | TEMPERATURE: 97.5 F

## 2018-05-02 DIAGNOSIS — R30.0 DYSURIA: ICD-10-CM

## 2018-05-02 DIAGNOSIS — N47.6 BALANOPOSTHITIS: Primary | ICD-10-CM

## 2018-05-02 DIAGNOSIS — Z12.11 SCREENING FOR COLON CANCER: ICD-10-CM

## 2018-05-02 DIAGNOSIS — M54.42 MIDLINE LOW BACK PAIN WITH LEFT-SIDED SCIATICA, UNSPECIFIED CHRONICITY: ICD-10-CM

## 2018-05-02 DIAGNOSIS — Z23 NEED FOR PNEUMOCOCCAL VACCINATION: ICD-10-CM

## 2018-05-02 LAB
BILIRUBIN, POC: ABNORMAL
BLOOD URINE, POC: ABNORMAL
CLARITY, POC: CLEAR
COLOR, POC: YELLOW
GLUCOSE URINE, POC: ABNORMAL
HBA1C MFR BLD: 11.6 %
KETONES, POC: ABNORMAL
LEUKOCYTE EST, POC: ABNORMAL
NITRITE, POC: ABNORMAL
PH, POC: 6
PROTEIN, POC: ABNORMAL
SPECIFIC GRAVITY, POC: 1.02
UROBILINOGEN, POC: ABNORMAL

## 2018-05-02 PROCEDURE — 99214 OFFICE O/P EST MOD 30 MIN: CPT | Performed by: NURSE PRACTITIONER

## 2018-05-02 PROCEDURE — 90471 IMMUNIZATION ADMIN: CPT | Performed by: NURSE PRACTITIONER

## 2018-05-02 PROCEDURE — 83036 HEMOGLOBIN GLYCOSYLATED A1C: CPT | Performed by: NURSE PRACTITIONER

## 2018-05-02 PROCEDURE — 81002 URINALYSIS NONAUTO W/O SCOPE: CPT | Performed by: NURSE PRACTITIONER

## 2018-05-02 PROCEDURE — 90732 PPSV23 VACC 2 YRS+ SUBQ/IM: CPT | Performed by: NURSE PRACTITIONER

## 2018-05-02 RX ORDER — HYDROCODONE BITARTRATE AND ACETAMINOPHEN 5; 325 MG/1; MG/1
1 TABLET ORAL 2 TIMES DAILY PRN
Qty: 180 TABLET | Refills: 0 | Status: CANCELLED | OUTPATIENT
Start: 2018-05-02 | End: 2018-07-31

## 2018-05-02 ASSESSMENT — ENCOUNTER SYMPTOMS
VOMITING: 0
SINUS PRESSURE: 0
SHORTNESS OF BREATH: 0
ABDOMINAL PAIN: 0
COLOR CHANGE: 1
NAUSEA: 0
EYE DISCHARGE: 0
DIARRHEA: 0
EYE PAIN: 0
RHINORRHEA: 0
EYE ITCHING: 0
TROUBLE SWALLOWING: 0
COUGH: 0

## 2018-05-03 ENCOUNTER — OFFICE VISIT (OUTPATIENT)
Dept: FAMILY MEDICINE CLINIC | Age: 63
End: 2018-05-03

## 2018-05-03 ENCOUNTER — TELEPHONE (OUTPATIENT)
Dept: FAMILY MEDICINE CLINIC | Age: 63
End: 2018-05-03

## 2018-05-03 VITALS
HEART RATE: 67 BPM | RESPIRATION RATE: 12 BRPM | OXYGEN SATURATION: 97 % | DIASTOLIC BLOOD PRESSURE: 62 MMHG | TEMPERATURE: 97.2 F | WEIGHT: 179 LBS | BODY MASS INDEX: 28.09 KG/M2 | SYSTOLIC BLOOD PRESSURE: 120 MMHG | HEIGHT: 67 IN

## 2018-05-03 DIAGNOSIS — I10 ESSENTIAL HYPERTENSION: ICD-10-CM

## 2018-05-03 DIAGNOSIS — E78.2 MIXED HYPERLIPIDEMIA: ICD-10-CM

## 2018-05-03 DIAGNOSIS — I25.10 CORONARY ARTERY DISEASE INVOLVING NATIVE CORONARY ARTERY OF NATIVE HEART WITHOUT ANGINA PECTORIS: ICD-10-CM

## 2018-05-03 DIAGNOSIS — N47.6 BALANOPOSTHITIS: ICD-10-CM

## 2018-05-03 DIAGNOSIS — R32 URINARY INCONTINENCE, UNSPECIFIED TYPE: Primary | ICD-10-CM

## 2018-05-03 DIAGNOSIS — S30.0XXA CONTUSION OF COCCYX, INITIAL ENCOUNTER: ICD-10-CM

## 2018-05-03 DIAGNOSIS — M54.42 MIDLINE LOW BACK PAIN WITH LEFT-SIDED SCIATICA, UNSPECIFIED CHRONICITY: ICD-10-CM

## 2018-05-03 DIAGNOSIS — R30.0 DYSURIA: Primary | ICD-10-CM

## 2018-05-03 LAB
ALBUMIN SERPL-MCNC: 4 G/DL (ref 3.9–4.9)
ALP BLD-CCNC: 57 U/L (ref 35–104)
ALT SERPL-CCNC: 20 U/L (ref 0–41)
ANION GAP SERPL CALCULATED.3IONS-SCNC: 16 MEQ/L (ref 7–13)
AST SERPL-CCNC: 13 U/L (ref 0–40)
BASOPHILS ABSOLUTE: 0 K/UL (ref 0–0.2)
BASOPHILS RELATIVE PERCENT: 0.3 %
BILIRUB SERPL-MCNC: 0.5 MG/DL (ref 0–1.2)
BUN BLDV-MCNC: 24 MG/DL (ref 8–23)
CALCIUM SERPL-MCNC: 9.2 MG/DL (ref 8.6–10.2)
CHLORIDE BLD-SCNC: 98 MEQ/L (ref 98–107)
CHOLESTEROL, TOTAL: 143 MG/DL (ref 0–199)
CO2: 24 MEQ/L (ref 22–29)
CREAT SERPL-MCNC: 0.7 MG/DL (ref 0.7–1.2)
CREATININE URINE: 97.3 MG/DL
EOSINOPHILS ABSOLUTE: 0.1 K/UL (ref 0–0.7)
EOSINOPHILS RELATIVE PERCENT: 1.2 %
GFR AFRICAN AMERICAN: >60
GFR NON-AFRICAN AMERICAN: >60
GLOBULIN: 2.4 G/DL (ref 2.3–3.5)
GLUCOSE BLD-MCNC: 254 MG/DL (ref 74–109)
HBA1C MFR BLD: 12.1 % (ref 4.8–5.9)
HCT VFR BLD CALC: 44.4 % (ref 42–52)
HDLC SERPL-MCNC: 41 MG/DL (ref 40–59)
HEMOGLOBIN: 15.1 G/DL (ref 14–18)
LDL CHOLESTEROL CALCULATED: 67 MG/DL (ref 0–129)
LYMPHOCYTES ABSOLUTE: 1.9 K/UL (ref 1–4.8)
LYMPHOCYTES RELATIVE PERCENT: 16.7 %
MCH RBC QN AUTO: 32.3 PG (ref 27–31.3)
MCHC RBC AUTO-ENTMCNC: 34.1 % (ref 33–37)
MCV RBC AUTO: 94.7 FL (ref 80–100)
MICROALBUMIN UR-MCNC: 54.9 MG/DL
MICROALBUMIN/CREAT UR-RTO: 564.2 MG/G (ref 0–30)
MONOCYTES ABSOLUTE: 0.7 K/UL (ref 0.2–0.8)
MONOCYTES RELATIVE PERCENT: 6.7 %
NEUTROPHILS ABSOLUTE: 8.4 K/UL (ref 1.4–6.5)
NEUTROPHILS RELATIVE PERCENT: 75.1 %
PDW BLD-RTO: 12.8 % (ref 11.5–14.5)
PLATELET # BLD: 205 K/UL (ref 130–400)
POTASSIUM SERPL-SCNC: 4.4 MEQ/L (ref 3.5–5.1)
RBC # BLD: 4.69 M/UL (ref 4.7–6.1)
SODIUM BLD-SCNC: 138 MEQ/L (ref 132–144)
TOTAL PROTEIN: 6.4 G/DL (ref 6.4–8.1)
TRIGL SERPL-MCNC: 177 MG/DL (ref 0–200)
WBC # BLD: 11.2 K/UL (ref 4.8–10.8)

## 2018-05-03 PROCEDURE — 99214 OFFICE O/P EST MOD 30 MIN: CPT | Performed by: FAMILY MEDICINE

## 2018-05-03 RX ORDER — HYDROCODONE BITARTRATE AND ACETAMINOPHEN 5; 325 MG/1; MG/1
1 TABLET ORAL EVERY 8 HOURS PRN
Qty: 21 TABLET | Refills: 0 | Status: SHIPPED | OUTPATIENT
Start: 2018-05-03 | End: 2018-05-10

## 2018-05-03 RX ORDER — GLUCOSAMINE HCL/CHONDROITIN SU 500-400 MG
CAPSULE ORAL
Qty: 100 STRIP | Refills: 5 | Status: SHIPPED | OUTPATIENT
Start: 2018-05-03 | End: 2018-11-08

## 2018-05-03 ASSESSMENT — PATIENT HEALTH QUESTIONNAIRE - PHQ9
2. FEELING DOWN, DEPRESSED OR HOPELESS: 0
SUM OF ALL RESPONSES TO PHQ9 QUESTIONS 1 & 2: 0
1. LITTLE INTEREST OR PLEASURE IN DOING THINGS: 0
SUM OF ALL RESPONSES TO PHQ QUESTIONS 1-9: 0

## 2018-05-04 RX ORDER — OXYBUTYNIN CHLORIDE 10 MG/1
10 TABLET, EXTENDED RELEASE ORAL DAILY
Qty: 30 TABLET | Refills: 3 | Status: SHIPPED | OUTPATIENT
Start: 2018-05-04 | End: 2018-12-12 | Stop reason: SDUPTHER

## 2018-05-05 LAB
ANAEROBIC CULTURE: ABNORMAL
GRAM STAIN RESULT: ABNORMAL
ORGANISM: ABNORMAL
WOUND/ABSCESS: ABNORMAL

## 2018-05-08 DIAGNOSIS — N47.6 BALANOPOSTHITIS: ICD-10-CM

## 2018-05-08 DIAGNOSIS — B94.8 SEQUELA OF CORYNEBACTERIUM INFECTION: Primary | ICD-10-CM

## 2018-05-08 RX ORDER — AMOXICILLIN 250 MG/1
500 CAPSULE ORAL 2 TIMES DAILY
Qty: 40 CAPSULE | Refills: 0 | Status: SHIPPED | OUTPATIENT
Start: 2018-05-08 | End: 2018-05-18

## 2018-09-05 LAB
ANION GAP SERPL CALCULATED.3IONS-SCNC: 15 MEQ/L (ref 7–13)
BUN BLDV-MCNC: 24 MG/DL (ref 8–23)
CALCIUM SERPL-MCNC: 10.1 MG/DL (ref 8.6–10.2)
CHLORIDE BLD-SCNC: 100 MEQ/L (ref 98–107)
CO2: 26 MEQ/L (ref 22–29)
CREAT SERPL-MCNC: 0.92 MG/DL (ref 0.7–1.2)
GFR AFRICAN AMERICAN: >60
GFR NON-AFRICAN AMERICAN: >60
GLUCOSE BLD-MCNC: 113 MG/DL (ref 74–109)
POTASSIUM SERPL-SCNC: 5.1 MEQ/L (ref 3.5–5.1)
SODIUM BLD-SCNC: 141 MEQ/L (ref 132–144)

## 2018-09-06 ENCOUNTER — TELEPHONE (OUTPATIENT)
Dept: FAMILY MEDICINE CLINIC | Age: 63
End: 2018-09-06

## 2018-09-13 ENCOUNTER — OFFICE VISIT (OUTPATIENT)
Dept: CARDIOLOGY CLINIC | Age: 63
End: 2018-09-13

## 2018-09-13 VITALS
SYSTOLIC BLOOD PRESSURE: 112 MMHG | OXYGEN SATURATION: 98 % | HEART RATE: 68 BPM | DIASTOLIC BLOOD PRESSURE: 62 MMHG | BODY MASS INDEX: 29.66 KG/M2 | WEIGHT: 189 LBS | RESPIRATION RATE: 16 BRPM | HEIGHT: 67 IN

## 2018-09-13 DIAGNOSIS — I10 ESSENTIAL HYPERTENSION: ICD-10-CM

## 2018-09-13 DIAGNOSIS — Z95.5 PRESENCE OF STENT IN CORONARY ARTERY: ICD-10-CM

## 2018-09-13 DIAGNOSIS — E78.2 MIXED HYPERLIPIDEMIA: ICD-10-CM

## 2018-09-13 DIAGNOSIS — I25.10 CORONARY ARTERY DISEASE INVOLVING NATIVE CORONARY ARTERY OF NATIVE HEART WITHOUT ANGINA PECTORIS: Primary | ICD-10-CM

## 2018-09-13 PROCEDURE — 99214 OFFICE O/P EST MOD 30 MIN: CPT | Performed by: INTERNAL MEDICINE

## 2018-09-13 RX ORDER — CLOPIDOGREL BISULFATE 75 MG/1
75 TABLET ORAL DAILY
COMMUNITY
End: 2019-01-29 | Stop reason: SDUPTHER

## 2018-09-13 RX ORDER — METOPROLOL SUCCINATE 25 MG/1
25 TABLET, EXTENDED RELEASE ORAL DAILY
COMMUNITY
End: 2018-11-08 | Stop reason: SDUPTHER

## 2018-09-13 RX ORDER — NITROGLYCERIN 0.4 MG/1
TABLET SUBLINGUAL
COMMUNITY
End: 2018-11-08 | Stop reason: SDUPTHER

## 2018-09-13 RX ORDER — LOSARTAN POTASSIUM 25 MG/1
25 TABLET ORAL DAILY
COMMUNITY
End: 2018-11-08 | Stop reason: SDUPTHER

## 2018-09-13 NOTE — PROGRESS NOTES
Chief Complaint   Patient presents with    Follow-Up from [address] Coronary Artery Disease       Patient presents for initial medical evaluation. Patient is followed on a regular basis by Dr. Jill Solorzano MD. S/p hosp for CP. Patient with hx of previous PCI. S/p abnormal stress test and [facility] with PCI of RCA with GOLD. Patent stents in LAD and RCA, 40-50% proximal CX. ECHO with EF of 60%, mild LVH. Doing ok overall. He had a mechanical fall and broke his ribs. Pt denies chest pain, dyspnea, dyspnea on exertion, change in exercise capacity, fatigue,  nausea, vomiting, diarrhea, constipation, motor weakness, insomnia, weight loss, syncope, dizziness, lightheadedness, palpitations, PND, orthopnea, or claudication. No nitro use. BP and hr are good. CAD is stable. No LE discoloration or ulcers. No LE edema. No CHF type symptoms. Lipid profile is normal. LDL is 40.              Patient Active Problem List   Diagnosis    Gastroesophageal reflux disease    Uncontrolled type 2 diabetes mellitus with diabetic polyneuropathy, with long-term current use of insulin ([facility] [address]    Essential hypertension    Anxiety    Coronary artery disease involving native coronary artery of native heart without angina pectoris    Diabetic neuropathy (HCC)    Hyperlipidemia    Presence of stent in coronary artery    Sciatica    Dysuria       Past Surgical History:   Procedure Laterality Date    APPENDECTOMY         Social History     Social History    Marital status:      Spouse name: N/A    Number of children: N/A    Years of education: N/A     Social History Main Topics    Smoking status: Never Smoker    Smokeless tobacco: Never Used    Alcohol use No    Drug use: No    Sexual activity: Not Asked     Other Topics Concern    None     Social History Narrative    None       Family History   Problem Relation Age of Onset    High Blood Pressure Mother     Diabetes Father     Heart Disease Father Current Outpatient Prescriptions   Medication Sig Dispense Refill    clopidogrel (PLAVIX) 75 MG tablet 75 mg daily      losartan (COZAAR) 25 MG tablet 25 mg daily      nitroGLYCERIN (NITROSTAT) 0.4 MG SL tablet       metoprolol succinate (TOPROL XL) 25 MG extended release tablet 25 mg daily      oxybutynin (DITROPAN-XL) 10 MG extended release tablet Take 1 tablet by mouth daily 30 tablet 3    Glucose Blood (BLOOD GLUCOSE TEST STRIPS) STRP Test blood sugar twice daily 100 strip 5    LANTUS SOLOSTAR 100 UNIT/ML injection pen INJECT 50 UNITS IN THE MORNING AND 45 UNITS IN THE EVENING 45 pen 0    atorvastatin (LIPITOR) 80 MG tablet TAKE 1 TABLET EVERY DAY 30 tablet 5    glipiZIDE (GLUCOTROL) 10 MG tablet TAKE 1 TABLET TWICE A DAY BEFORE MEALS 60 tablet 5    pantoprazole (PROTONIX) 20 MG tablet TAKE 1 TABLET EVERY DAY IN THE AM 30 tablet 5    sertraline (ZOLOFT) 100 MG tablet TAKE 1 TABLET EVERY DAY 30 tablet 5    gabapentin (NEURONTIN) 300 MG capsule TAKE ONE CAPSULE 3 TIMES A DAY 90 capsule 5    Blood Glucose Monitoring Suppl SAMEER 1 Device by Does not apply route daily 1 Device 0    Insulin Pen Needle ([employer] PEN NEEDLES 29G) 29G X 12MM MISC 1 each by Does not apply route daily 100 each 5    metFORMIN (GLUCOPHAGE) 1000 MG tablet Take 1 tablet by mouth 2 times daily (with meals) 180 tablet 3    ranolazine (RANEXA) 1000 MG extended release tablet Take 1 tablet by mouth 2 times daily 60 tablet 5    Lancets MISC Twice daily testing 100 each 5    butenafine (MENTAX) 1 % CREA Apply twice a day as needed 15 g 5    aspirin 81 MG tablet Take 81 mg by mouth daily       No current facility-administered medications for this visit. Patient has no known allergies. Review of Systems:  General ROS: negative  Psychological ROS: negative  Hematological and Lymphatic ROS: No history of blood clots or bleeding disorder.    Respiratory ROS: no cough, shortness of breath, or wheezing  Cardiovascular ROS: no chest pain or dyspnea on exertion  Gastrointestinal ROS: no abdominal pain, change in bowel habits, or black or bloody stools  Genito-Urinary ROS: no dysuria, trouble voiding, or hematuria  Musculoskeletal ROS: negative  Neurological ROS: no TIA or stroke symptoms  Dermatological ROS: negative    VITALS:  Blood pressure 112/62, pulse 68, resp. rate 16, height 5' 7\" (1.702 m), weight 189 lb (85.7 kg), SpO2 98 %. Body mass index is 29.6 kg/m². Physical Examination:  General appearance - alert, well appearing, and in no distress  Mental status - alert, oriented to person, place, and time  Neck - Neck is supple, no JVD or carotid bruits. No thyromegaly or adenopathy. Chest - clear to auscultation, no wheezes, rales or rhonchi, symmetric air entry  Heart - normal rate, regular rhythm, normal S1, S2, no murmurs, rubs, clicks or gallops  Abdomen - soft, nontender, nondistended, no masses or organomegaly  Neurological - alert, oriented, normal speech, no focal findings or movement disorder noted  Extremities - peripheral pulses normal, no pedal edema, no clubbing or cyanosis  Skin - normal coloration and turgor, no rashes, no suspicious skin lesions noted      EKG: normal sinus rhythm, nonspecific ST and T waves changes    No orders of the defined types were placed in this encounter. ASSESSMENT:     Diagnosis Orders   1. Coronary artery disease involving native coronary artery of native heart without angina pectoris     2. Essential hypertension     3. Mixed hyperlipidemia     4. Presence of stent in coronary artery           PLAN:     Patient will need to continue to follow up with you for their general medical care and return to see me in the office in 6 months    As always, aggressive risk factor modification is strongly recommended. We should adhere to the JNC VIII guidelines for HTN management and the NCEP ATP III guidelines for LDL-C management.     Cardiac diet is always recommended with low fat,

## 2018-11-08 ENCOUNTER — OFFICE VISIT (OUTPATIENT)
Dept: FAMILY MEDICINE CLINIC | Age: 63
End: 2018-11-08
Payer: COMMERCIAL

## 2018-11-08 ENCOUNTER — TELEPHONE (OUTPATIENT)
Dept: FAMILY MEDICINE CLINIC | Age: 63
End: 2018-11-08

## 2018-11-08 VITALS
HEIGHT: 67 IN | TEMPERATURE: 97.3 F | WEIGHT: 199.4 LBS | HEART RATE: 76 BPM | DIASTOLIC BLOOD PRESSURE: 70 MMHG | SYSTOLIC BLOOD PRESSURE: 116 MMHG | OXYGEN SATURATION: 97 % | RESPIRATION RATE: 16 BRPM | BODY MASS INDEX: 31.3 KG/M2

## 2018-11-08 DIAGNOSIS — Z79.4 TYPE 2 DIABETES MELLITUS WITH HYPERGLYCEMIA, WITH LONG-TERM CURRENT USE OF INSULIN (HCC): ICD-10-CM

## 2018-11-08 DIAGNOSIS — H90.3 SENSORINEURAL HEARING LOSS (SNHL) OF BOTH EARS: ICD-10-CM

## 2018-11-08 DIAGNOSIS — K21.9 GASTROESOPHAGEAL REFLUX DISEASE, ESOPHAGITIS PRESENCE NOT SPECIFIED: ICD-10-CM

## 2018-11-08 DIAGNOSIS — I25.10 CORONARY ARTERY DISEASE INVOLVING NATIVE CORONARY ARTERY OF NATIVE HEART WITHOUT ANGINA PECTORIS: ICD-10-CM

## 2018-11-08 DIAGNOSIS — I10 ESSENTIAL HYPERTENSION: ICD-10-CM

## 2018-11-08 DIAGNOSIS — E11.65 TYPE 2 DIABETES MELLITUS WITH HYPERGLYCEMIA, WITH LONG-TERM CURRENT USE OF INSULIN (HCC): ICD-10-CM

## 2018-11-08 DIAGNOSIS — G89.29 CHRONIC MIDLINE LOW BACK PAIN WITH LEFT-SIDED SCIATICA: Primary | ICD-10-CM

## 2018-11-08 DIAGNOSIS — F41.9 ANXIETY: ICD-10-CM

## 2018-11-08 DIAGNOSIS — M54.42 CHRONIC MIDLINE LOW BACK PAIN WITH LEFT-SIDED SCIATICA: Primary | ICD-10-CM

## 2018-11-08 DIAGNOSIS — S22.41XD CLOSED FRACTURE OF MULTIPLE RIBS OF RIGHT SIDE WITH ROUTINE HEALING, SUBSEQUENT ENCOUNTER: Primary | ICD-10-CM

## 2018-11-08 DIAGNOSIS — E78.2 MIXED HYPERLIPIDEMIA: ICD-10-CM

## 2018-11-08 PROCEDURE — 99215 OFFICE O/P EST HI 40 MIN: CPT | Performed by: FAMILY MEDICINE

## 2018-11-08 PROCEDURE — G8484 FLU IMMUNIZE NO ADMIN: HCPCS | Performed by: FAMILY MEDICINE

## 2018-11-08 PROCEDURE — G8417 CALC BMI ABV UP PARAM F/U: HCPCS | Performed by: FAMILY MEDICINE

## 2018-11-08 PROCEDURE — 2022F DILAT RTA XM EVC RTNOPTHY: CPT | Performed by: FAMILY MEDICINE

## 2018-11-08 PROCEDURE — G8427 DOCREV CUR MEDS BY ELIG CLIN: HCPCS | Performed by: FAMILY MEDICINE

## 2018-11-08 PROCEDURE — 3045F PR MOST RECENT HEMOGLOBIN A1C LEVEL 7.0-9.0%: CPT | Performed by: FAMILY MEDICINE

## 2018-11-08 PROCEDURE — 3017F COLORECTAL CA SCREEN DOC REV: CPT | Performed by: FAMILY MEDICINE

## 2018-11-08 PROCEDURE — 1036F TOBACCO NON-USER: CPT | Performed by: FAMILY MEDICINE

## 2018-11-08 PROCEDURE — G8598 ASA/ANTIPLAT THER USED: HCPCS | Performed by: FAMILY MEDICINE

## 2018-11-08 RX ORDER — ATORVASTATIN CALCIUM 80 MG/1
TABLET, FILM COATED ORAL
Qty: 90 TABLET | Refills: 3 | Status: SHIPPED | OUTPATIENT
Start: 2018-11-08 | End: 2019-01-29 | Stop reason: SDUPTHER

## 2018-11-08 RX ORDER — METOPROLOL SUCCINATE 25 MG/1
25 TABLET, EXTENDED RELEASE ORAL DAILY
Qty: 90 TABLET | Refills: 3 | Status: SHIPPED | OUTPATIENT
Start: 2018-11-08 | End: 2019-01-29 | Stop reason: SDUPTHER

## 2018-11-08 RX ORDER — DIPHENHYDRAMINE HYDROCHLORIDE 25 MG/1
CAPSULE, LIQUID FILLED ORAL
Qty: 1 KIT | Refills: 0 | Status: SHIPPED | OUTPATIENT
Start: 2018-11-08

## 2018-11-08 RX ORDER — BLOOD-GLUCOSE METER
1 KIT MISCELLANEOUS DAILY
Qty: 1 KIT | Refills: 0 | Status: CANCELLED | OUTPATIENT
Start: 2018-11-08

## 2018-11-08 RX ORDER — LIDOCAINE 50 MG/G
PATCH TOPICAL
COMMUNITY
Start: 2018-10-15 | End: 2018-11-08 | Stop reason: SDUPTHER

## 2018-11-08 RX ORDER — NICOTINE POLACRILEX 4 MG
15 LOZENGE BUCCAL SEE ADMIN INSTRUCTIONS
COMMUNITY
End: 2019-01-29 | Stop reason: SDUPTHER

## 2018-11-08 RX ORDER — TRAMADOL HYDROCHLORIDE 50 MG/1
50 TABLET ORAL EVERY 6 HOURS PRN
Qty: 90 TABLET | Refills: 3 | Status: SHIPPED | OUTPATIENT
Start: 2018-11-08 | End: 2018-12-10 | Stop reason: ALTCHOICE

## 2018-11-08 RX ORDER — LIDOCAINE 50 MG/G
1 PATCH TOPICAL DAILY
Qty: 30 PATCH | Refills: 3 | Status: SHIPPED | OUTPATIENT
Start: 2018-11-08

## 2018-11-08 RX ORDER — TRAMADOL HYDROCHLORIDE 50 MG/1
50 TABLET ORAL EVERY 6 HOURS PRN
COMMUNITY
End: 2018-11-08 | Stop reason: SDUPTHER

## 2018-11-08 RX ORDER — LANCETS 30 GAUGE
EACH MISCELLANEOUS
Qty: 100 EACH | Refills: 3 | Status: SHIPPED | OUTPATIENT
Start: 2018-11-08

## 2018-11-08 ASSESSMENT — ENCOUNTER SYMPTOMS
ABDOMINAL PAIN: 0
SHORTNESS OF BREATH: 0
CONSTIPATION: 0
SORE THROAT: 0
SINUS PRESSURE: 0
EYE REDNESS: 0
COUGH: 0
NAUSEA: 0
VOMITING: 0
CHEST TIGHTNESS: 0
DIARRHEA: 0
EYE DISCHARGE: 0

## 2018-11-08 ASSESSMENT — PATIENT HEALTH QUESTIONNAIRE - PHQ9
SUM OF ALL RESPONSES TO PHQ9 QUESTIONS 1 & 2: 0
2. FEELING DOWN, DEPRESSED OR HOPELESS: 0
1. LITTLE INTEREST OR PLEASURE IN DOING THINGS: 0
SUM OF ALL RESPONSES TO PHQ QUESTIONS 1-9: 0
SUM OF ALL RESPONSES TO PHQ QUESTIONS 1-9: 0

## 2018-11-09 ENCOUNTER — TELEPHONE (OUTPATIENT)
Dept: FAMILY MEDICINE CLINIC | Age: 63
End: 2018-11-09

## 2018-11-09 DIAGNOSIS — E11.65 TYPE 2 DIABETES MELLITUS WITH HYPERGLYCEMIA, WITH LONG-TERM CURRENT USE OF INSULIN (HCC): ICD-10-CM

## 2018-11-09 DIAGNOSIS — Z79.4 TYPE 2 DIABETES MELLITUS WITH HYPERGLYCEMIA, WITH LONG-TERM CURRENT USE OF INSULIN (HCC): ICD-10-CM

## 2018-11-14 RX ORDER — LOSARTAN POTASSIUM 25 MG/1
25 TABLET ORAL DAILY
Qty: 30 TABLET | Refills: 5 | Status: SHIPPED | OUTPATIENT
Start: 2018-11-14 | End: 2019-01-29 | Stop reason: SDUPTHER

## 2018-11-14 RX ORDER — PANTOPRAZOLE SODIUM 20 MG/1
TABLET, DELAYED RELEASE ORAL
Qty: 30 TABLET | Refills: 5 | Status: SHIPPED | OUTPATIENT
Start: 2018-11-14 | End: 2019-01-29 | Stop reason: SDUPTHER

## 2018-11-14 RX ORDER — GABAPENTIN 300 MG/1
CAPSULE ORAL
Qty: 90 CAPSULE | Refills: 5 | Status: SHIPPED | OUTPATIENT
Start: 2018-11-14 | End: 2018-12-10 | Stop reason: ALTCHOICE

## 2018-11-14 RX ORDER — NITROGLYCERIN 0.4 MG/1
TABLET SUBLINGUAL
Qty: 25 TABLET | Refills: 0 | Status: SHIPPED | OUTPATIENT
Start: 2018-11-14 | End: 2019-01-29 | Stop reason: SDUPTHER

## 2018-11-14 RX ORDER — SERTRALINE HYDROCHLORIDE 100 MG/1
TABLET, FILM COATED ORAL
Qty: 30 TABLET | Refills: 5 | Status: SHIPPED | OUTPATIENT
Start: 2018-11-14 | End: 2019-01-29 | Stop reason: SDUPTHER

## 2018-11-14 RX ORDER — RANOLAZINE 1000 MG/1
1000 TABLET, EXTENDED RELEASE ORAL 2 TIMES DAILY
Qty: 60 TABLET | Refills: 5 | Status: SHIPPED | OUTPATIENT
Start: 2018-11-14 | End: 2019-01-29 | Stop reason: SDUPTHER

## 2018-11-28 ENCOUNTER — TELEPHONE (OUTPATIENT)
Dept: FAMILY MEDICINE CLINIC | Age: 63
End: 2018-11-28

## 2018-11-28 NOTE — TELEPHONE ENCOUNTER
FYI:  Tanisha calling from the [facility] calling as per patient request he is being discharged for their services one week early.

## 2018-11-29 ENCOUNTER — TELEPHONE (OUTPATIENT)
Dept: FAMILY MEDICINE CLINIC | Age: 63
End: 2018-11-29

## 2018-12-03 ENCOUNTER — TELEPHONE (OUTPATIENT)
Dept: FAMILY MEDICINE CLINIC | Age: 63
End: 2018-12-03

## 2018-12-03 NOTE — TELEPHONE ENCOUNTER
Pt's sister called in stating that he is having his teeth pulled and the dentist will not do the surgery unless it is cleared by you. I told told Titus Loya that he would more than likely need an appointment. She wants to know if you can just send a letter to the dentist to approve the surgery without him being seen.

## 2018-12-10 ENCOUNTER — OFFICE VISIT (OUTPATIENT)
Dept: FAMILY MEDICINE CLINIC | Age: 63
End: 2018-12-10
Payer: COMMERCIAL

## 2018-12-10 VITALS
HEIGHT: 67 IN | DIASTOLIC BLOOD PRESSURE: 72 MMHG | OXYGEN SATURATION: 95 % | WEIGHT: 203.4 LBS | SYSTOLIC BLOOD PRESSURE: 130 MMHG | HEART RATE: 66 BPM | TEMPERATURE: 96.7 F | RESPIRATION RATE: 18 BRPM | BODY MASS INDEX: 31.92 KG/M2

## 2018-12-10 DIAGNOSIS — H10.023 OTHER MUCOPURULENT CONJUNCTIVITIS OF BOTH EYES: ICD-10-CM

## 2018-12-10 DIAGNOSIS — K08.9 POOR DENTITION: Primary | ICD-10-CM

## 2018-12-10 DIAGNOSIS — I25.118 CORONARY ARTERY DISEASE OF NATIVE ARTERY OF NATIVE HEART WITH STABLE ANGINA PECTORIS (HCC): ICD-10-CM

## 2018-12-10 DIAGNOSIS — Z01.818 PREOPERATIVE CLEARANCE: ICD-10-CM

## 2018-12-10 DIAGNOSIS — Z12.11 ENCOUNTER FOR SCREENING COLONOSCOPY: ICD-10-CM

## 2018-12-10 DIAGNOSIS — K59.03 DRUG-INDUCED CONSTIPATION: ICD-10-CM

## 2018-12-10 DIAGNOSIS — R07.81 PAIN IN RIB: ICD-10-CM

## 2018-12-10 PROCEDURE — G8427 DOCREV CUR MEDS BY ELIG CLIN: HCPCS | Performed by: FAMILY MEDICINE

## 2018-12-10 PROCEDURE — 3017F COLORECTAL CA SCREEN DOC REV: CPT | Performed by: FAMILY MEDICINE

## 2018-12-10 PROCEDURE — G8484 FLU IMMUNIZE NO ADMIN: HCPCS | Performed by: FAMILY MEDICINE

## 2018-12-10 PROCEDURE — 1036F TOBACCO NON-USER: CPT | Performed by: FAMILY MEDICINE

## 2018-12-10 PROCEDURE — G8598 ASA/ANTIPLAT THER USED: HCPCS | Performed by: FAMILY MEDICINE

## 2018-12-10 PROCEDURE — 99215 OFFICE O/P EST HI 40 MIN: CPT | Performed by: FAMILY MEDICINE

## 2018-12-10 PROCEDURE — G8417 CALC BMI ABV UP PARAM F/U: HCPCS | Performed by: FAMILY MEDICINE

## 2018-12-10 PROCEDURE — 3045F PR MOST RECENT HEMOGLOBIN A1C LEVEL 7.0-9.0%: CPT | Performed by: FAMILY MEDICINE

## 2018-12-10 PROCEDURE — 2022F DILAT RTA XM EVC RTNOPTHY: CPT | Performed by: FAMILY MEDICINE

## 2018-12-10 RX ORDER — LUBIPROSTONE 24 UG/1
24 CAPSULE, GELATIN COATED ORAL 2 TIMES DAILY WITH MEALS
Qty: 60 CAPSULE | Refills: 3 | Status: SHIPPED | OUTPATIENT
Start: 2018-12-10 | End: 2019-01-29 | Stop reason: SDUPTHER

## 2018-12-10 RX ORDER — OXYCODONE HYDROCHLORIDE AND ACETAMINOPHEN 5; 325 MG/1; MG/1
1 TABLET ORAL EVERY 12 HOURS PRN
Qty: 60 TABLET | Refills: 0 | Status: SHIPPED | OUTPATIENT
Start: 2018-12-10 | End: 2019-01-09

## 2018-12-10 RX ORDER — TOBRAMYCIN 3 MG/ML
1 SOLUTION/ DROPS OPHTHALMIC EVERY 4 HOURS
Qty: 5 ML | Refills: 0 | Status: SHIPPED | OUTPATIENT
Start: 2018-12-10

## 2018-12-10 ASSESSMENT — PATIENT HEALTH QUESTIONNAIRE - PHQ9
2. FEELING DOWN, DEPRESSED OR HOPELESS: 0
1. LITTLE INTEREST OR PLEASURE IN DOING THINGS: 0
SUM OF ALL RESPONSES TO PHQ9 QUESTIONS 1 & 2: 0
SUM OF ALL RESPONSES TO PHQ QUESTIONS 1-9: 0
SUM OF ALL RESPONSES TO PHQ QUESTIONS 1-9: 0

## 2018-12-10 NOTE — PROGRESS NOTES
(PLAVIX) 75 MG tablet 75 mg daily      glipiZIDE (GLUCOTROL) 10 MG tablet TAKE 1 TABLET TWICE A DAY BEFORE MEALS 60 tablet 5    Blood Glucose Monitoring Suppl SAMEER 1 Device by Does not apply route daily 1 Device 0    Insulin Pen Needle ([employer] PEN NEEDLES 29G) 29G X 12MM MISC 1 each by Does not apply route daily 100 each 5    gentamicin (GARAMYCIN) 0.3 % ophthalmic solution Place 1 drop into both eyes 3 times daily 5 mL 1    oxybutynin (DITROPAN-XL) 10 MG extended release tablet Take 1 tablet by mouth daily 30 tablet 5     No current facility-administered medications for this visit. No Known Allergies    Social History     Social History    Marital status:      Spouse name: N/A    Number of children: N/A    Years of education: N/A     Social History Main Topics    Smoking status: Never Smoker    Smokeless tobacco: Never Used    Alcohol use No    Drug use: No    Sexual activity: Not Asked     Other Topics Concern    None     Social History Narrative    None     Family History   Problem Relation Age of Onset    High Blood Pressure Mother     Diabetes Father     Heart Disease Father        Review of Systems - REVIEW OF SYSTEMS: CONSTITUTIONAL: Denies: fever, chills, diaphoresis, weakness, fatigue  ALLERGIES: Denies: urticaria, angioedema  EYES:Denies: blurry vision, decreased vision, loss of vision, eye pain, diplopia  ENT: Denies: sore throat, nasal congestion, nasal discharge, epistaxis, tinnitus. His dentition is very poorly maintained.   CARDIOVASCULAR: Denies: chest pain, dyspnea on exertion, edema, palpitations  RESPIRATORY: Reports no cough, hemoptysis, shortness of breath,   ENDOCRINE: Denies: polydipsia/polyuria, temperature intolerance, unexpected weight changes  HEME-LYMPH: Denies: bleeding, bruising  GI: Denies: abdominal pain, flank pain, nausea, vomiting, diarrhea, constipation, black stool, blood in stool  : Denies: dysuria, frequency/urgency, hematuria  NEURO: Denies:

## 2018-12-12 DIAGNOSIS — R32 URINARY INCONTINENCE, UNSPECIFIED TYPE: ICD-10-CM

## 2018-12-12 RX ORDER — OXYBUTYNIN CHLORIDE 10 MG/1
10 TABLET, EXTENDED RELEASE ORAL DAILY
Qty: 30 TABLET | Refills: 5 | Status: SHIPPED | OUTPATIENT
Start: 2018-12-12 | End: 2019-01-29 | Stop reason: SDUPTHER

## 2018-12-14 ENCOUNTER — TELEPHONE (OUTPATIENT)
Dept: FAMILY MEDICINE CLINIC | Age: 63
End: 2018-12-14

## 2018-12-14 DIAGNOSIS — H10.403 CHRONIC CONJUNCTIVITIS OF BOTH EYES, UNSPECIFIED CHRONIC CONJUNCTIVITIS TYPE: Primary | ICD-10-CM

## 2018-12-14 RX ORDER — GENTAMICIN SULFATE 3 MG/ML
1 SOLUTION/ DROPS OPHTHALMIC 3 TIMES DAILY
Qty: 5 ML | Refills: 1 | Status: SHIPPED | OUTPATIENT
Start: 2018-12-14

## 2019-01-11 DIAGNOSIS — E11.65 TYPE 2 DIABETES MELLITUS WITH HYPERGLYCEMIA, WITH LONG-TERM CURRENT USE OF INSULIN (HCC): ICD-10-CM

## 2019-01-11 DIAGNOSIS — Z79.4 TYPE 2 DIABETES MELLITUS WITH HYPERGLYCEMIA, WITH LONG-TERM CURRENT USE OF INSULIN (HCC): ICD-10-CM

## 2019-01-14 ENCOUNTER — TELEPHONE (OUTPATIENT)
Dept: FAMILY MEDICINE CLINIC | Age: 64
End: 2019-01-14

## 2019-01-14 DIAGNOSIS — Z12.11 ENCOUNTER FOR SCREENING COLONOSCOPY: Primary | ICD-10-CM

## 2019-01-29 ENCOUNTER — OFFICE VISIT (OUTPATIENT)
Dept: FAMILY MEDICINE CLINIC | Age: 64
End: 2019-01-29
Payer: COMMERCIAL

## 2019-01-29 ENCOUNTER — HOSPITAL ENCOUNTER (OUTPATIENT)
Dept: GENERAL RADIOLOGY | Age: 64
Discharge: HOME | End: 2019-01-31
Payer: COMMERCIAL

## 2019-01-29 VITALS
SYSTOLIC BLOOD PRESSURE: 136 MMHG | HEART RATE: 74 BPM | WEIGHT: 198.4 LBS | RESPIRATION RATE: 16 BRPM | DIASTOLIC BLOOD PRESSURE: 80 MMHG | TEMPERATURE: 97.6 F | HEIGHT: 67 IN | OXYGEN SATURATION: 94 % | BODY MASS INDEX: 31.14 KG/M2

## 2019-01-29 DIAGNOSIS — F41.9 ANXIETY: ICD-10-CM

## 2019-01-29 DIAGNOSIS — E11.65 TYPE 2 DIABETES MELLITUS WITH HYPERGLYCEMIA, WITH LONG-TERM CURRENT USE OF INSULIN (HCC): Primary | ICD-10-CM

## 2019-01-29 DIAGNOSIS — I25.10 CORONARY ARTERY DISEASE INVOLVING NATIVE CORONARY ARTERY OF NATIVE HEART WITHOUT ANGINA PECTORIS: ICD-10-CM

## 2019-01-29 DIAGNOSIS — S22.41XD CLOSED FRACTURE OF MULTIPLE RIBS OF RIGHT SIDE WITH ROUTINE HEALING, SUBSEQUENT ENCOUNTER: ICD-10-CM

## 2019-01-29 DIAGNOSIS — E11.65 TYPE 2 DIABETES MELLITUS WITH HYPERGLYCEMIA, WITH LONG-TERM CURRENT USE OF INSULIN (HCC): ICD-10-CM

## 2019-01-29 DIAGNOSIS — I10 ESSENTIAL HYPERTENSION: ICD-10-CM

## 2019-01-29 DIAGNOSIS — E78.2 MIXED HYPERLIPIDEMIA: ICD-10-CM

## 2019-01-29 DIAGNOSIS — K21.9 GASTROESOPHAGEAL REFLUX DISEASE, ESOPHAGITIS PRESENCE NOT SPECIFIED: ICD-10-CM

## 2019-01-29 DIAGNOSIS — Z79.4 TYPE 2 DIABETES MELLITUS WITH HYPERGLYCEMIA, WITH LONG-TERM CURRENT USE OF INSULIN (HCC): Primary | ICD-10-CM

## 2019-01-29 DIAGNOSIS — Z79.4 TYPE 2 DIABETES MELLITUS WITH HYPERGLYCEMIA, WITH LONG-TERM CURRENT USE OF INSULIN (HCC): ICD-10-CM

## 2019-01-29 DIAGNOSIS — R32 URINARY INCONTINENCE, UNSPECIFIED TYPE: ICD-10-CM

## 2019-01-29 DIAGNOSIS — K59.03 DRUG-INDUCED CONSTIPATION: ICD-10-CM

## 2019-01-29 LAB
ALBUMIN SERPL-MCNC: 4.7 G/DL (ref 3.9–4.9)
ALP BLD-CCNC: 70 U/L (ref 35–104)
ALT SERPL-CCNC: 28 U/L (ref 0–41)
ANION GAP SERPL CALCULATED.3IONS-SCNC: 14 MEQ/L (ref 7–13)
AST SERPL-CCNC: 25 U/L (ref 0–40)
BASOPHILS ABSOLUTE: 0 K/UL (ref 0–0.2)
BASOPHILS RELATIVE PERCENT: 0.5 %
BILIRUB SERPL-MCNC: 0.6 MG/DL (ref 0–1.2)
BUN BLDV-MCNC: 19 MG/DL (ref 8–23)
CALCIUM SERPL-MCNC: 10.1 MG/DL (ref 8.6–10.2)
CHLORIDE BLD-SCNC: 97 MEQ/L (ref 98–107)
CHOLESTEROL, TOTAL: 112 MG/DL (ref 0–199)
CO2: 27 MEQ/L (ref 22–29)
CREAT SERPL-MCNC: 0.92 MG/DL (ref 0.7–1.2)
CREATININE URINE: 133.4 MG/DL
EOSINOPHILS ABSOLUTE: 0.1 K/UL (ref 0–0.7)
EOSINOPHILS RELATIVE PERCENT: 1.5 %
GFR AFRICAN AMERICAN: >60
GFR NON-AFRICAN AMERICAN: >60
GLOBULIN: 3 G/DL (ref 2.3–3.5)
GLUCOSE BLD-MCNC: 205 MG/DL (ref 74–109)
HBA1C MFR BLD: 9.1 % (ref 4.8–5.9)
HCT VFR BLD CALC: 43 % (ref 42–52)
HDLC SERPL-MCNC: 36 MG/DL (ref 40–59)
HEMOGLOBIN: 15.2 G/DL (ref 14–18)
LDL CHOLESTEROL CALCULATED: 37 MG/DL (ref 0–129)
LYMPHOCYTES ABSOLUTE: 1.9 K/UL (ref 1–4.8)
LYMPHOCYTES RELATIVE PERCENT: 19.8 %
MCH RBC QN AUTO: 33 PG (ref 27–31.3)
MCHC RBC AUTO-ENTMCNC: 35.2 % (ref 33–37)
MCV RBC AUTO: 93.7 FL (ref 80–100)
MICROALBUMIN UR-MCNC: 75.1 MG/DL
MICROALBUMIN/CREAT UR-RTO: 563 MG/G (ref 0–30)
MONOCYTES ABSOLUTE: 0.8 K/UL (ref 0.2–0.8)
MONOCYTES RELATIVE PERCENT: 8.9 %
NEUTROPHILS ABSOLUTE: 6.5 K/UL (ref 1.4–6.5)
NEUTROPHILS RELATIVE PERCENT: 69.3 %
PDW BLD-RTO: 13 % (ref 11.5–14.5)
PLATELET # BLD: 211 K/UL (ref 130–400)
POTASSIUM SERPL-SCNC: 4.9 MEQ/L (ref 3.5–5.1)
RBC # BLD: 4.59 M/UL (ref 4.7–6.1)
SODIUM BLD-SCNC: 138 MEQ/L (ref 132–144)
TOTAL PROTEIN: 7.7 G/DL (ref 6.4–8.1)
TRIGL SERPL-MCNC: 196 MG/DL (ref 0–200)
WBC # BLD: 9.4 K/UL (ref 4.8–10.8)

## 2019-01-29 PROCEDURE — 3017F COLORECTAL CA SCREEN DOC REV: CPT | Performed by: FAMILY MEDICINE

## 2019-01-29 PROCEDURE — G8598 ASA/ANTIPLAT THER USED: HCPCS | Performed by: FAMILY MEDICINE

## 2019-01-29 PROCEDURE — 99214 OFFICE O/P EST MOD 30 MIN: CPT | Performed by: FAMILY MEDICINE

## 2019-01-29 PROCEDURE — G8484 FLU IMMUNIZE NO ADMIN: HCPCS | Performed by: FAMILY MEDICINE

## 2019-01-29 PROCEDURE — 1036F TOBACCO NON-USER: CPT | Performed by: FAMILY MEDICINE

## 2019-01-29 PROCEDURE — 3046F HEMOGLOBIN A1C LEVEL >9.0%: CPT | Performed by: FAMILY MEDICINE

## 2019-01-29 PROCEDURE — 2022F DILAT RTA XM EVC RTNOPTHY: CPT | Performed by: FAMILY MEDICINE

## 2019-01-29 PROCEDURE — 71100 X-RAY EXAM RIBS UNI 2 VIEWS: CPT

## 2019-01-29 PROCEDURE — G8427 DOCREV CUR MEDS BY ELIG CLIN: HCPCS | Performed by: FAMILY MEDICINE

## 2019-01-29 PROCEDURE — G8417 CALC BMI ABV UP PARAM F/U: HCPCS | Performed by: FAMILY MEDICINE

## 2019-01-29 RX ORDER — LUBIPROSTONE 24 UG/1
24 CAPSULE, GELATIN COATED ORAL 2 TIMES DAILY WITH MEALS
Qty: 60 CAPSULE | Refills: 3 | Status: SHIPPED | OUTPATIENT
Start: 2019-01-29

## 2019-01-29 RX ORDER — CLOPIDOGREL BISULFATE 75 MG/1
75 TABLET ORAL DAILY
Qty: 30 TABLET | Refills: 5 | Status: SHIPPED | OUTPATIENT
Start: 2019-01-29 | End: 2019-10-08 | Stop reason: SDUPTHER

## 2019-01-29 RX ORDER — NICOTINE POLACRILEX 4 MG
15 LOZENGE BUCCAL SEE ADMIN INSTRUCTIONS
Qty: 45 G | Refills: 3 | Status: SHIPPED | OUTPATIENT
Start: 2019-01-29

## 2019-01-29 RX ORDER — METOPROLOL SUCCINATE 25 MG/1
25 TABLET, EXTENDED RELEASE ORAL DAILY
Qty: 90 TABLET | Refills: 3 | Status: SHIPPED | OUTPATIENT
Start: 2019-01-29 | End: 2019-10-08 | Stop reason: SDUPTHER

## 2019-01-29 RX ORDER — GLIPIZIDE 10 MG/1
10 TABLET ORAL
Qty: 60 TABLET | Refills: 5 | Status: SHIPPED | OUTPATIENT
Start: 2019-01-29

## 2019-01-29 RX ORDER — RANOLAZINE 1000 MG/1
1000 TABLET, EXTENDED RELEASE ORAL 2 TIMES DAILY
Qty: 60 TABLET | Refills: 5 | Status: SHIPPED | OUTPATIENT
Start: 2019-01-29

## 2019-01-29 RX ORDER — OXYBUTYNIN CHLORIDE 10 MG/1
10 TABLET, EXTENDED RELEASE ORAL DAILY
Qty: 30 TABLET | Refills: 5 | Status: SHIPPED | OUTPATIENT
Start: 2019-01-29

## 2019-01-29 RX ORDER — SERTRALINE HYDROCHLORIDE 100 MG/1
TABLET, FILM COATED ORAL
Qty: 30 TABLET | Refills: 5 | Status: SHIPPED | OUTPATIENT
Start: 2019-01-29

## 2019-01-29 RX ORDER — ATORVASTATIN CALCIUM 80 MG/1
TABLET, FILM COATED ORAL
Qty: 90 TABLET | Refills: 3 | Status: SHIPPED | OUTPATIENT
Start: 2019-01-29

## 2019-01-29 RX ORDER — NITROGLYCERIN 0.4 MG/1
TABLET SUBLINGUAL
Qty: 25 TABLET | Refills: 0 | Status: SHIPPED | OUTPATIENT
Start: 2019-01-29

## 2019-01-29 RX ORDER — PANTOPRAZOLE SODIUM 20 MG/1
TABLET, DELAYED RELEASE ORAL
Qty: 30 TABLET | Refills: 5 | Status: SHIPPED | OUTPATIENT
Start: 2019-01-29

## 2019-01-29 RX ORDER — LOSARTAN POTASSIUM 25 MG/1
25 TABLET ORAL DAILY
Qty: 30 TABLET | Refills: 5 | Status: SHIPPED | OUTPATIENT
Start: 2019-01-29 | End: 2019-10-08 | Stop reason: SDUPTHER

## 2019-01-29 ASSESSMENT — PATIENT HEALTH QUESTIONNAIRE - PHQ9
SUM OF ALL RESPONSES TO PHQ QUESTIONS 1-9: 0
1. LITTLE INTEREST OR PLEASURE IN DOING THINGS: 0
SUM OF ALL RESPONSES TO PHQ QUESTIONS 1-9: 0
SUM OF ALL RESPONSES TO PHQ9 QUESTIONS 1 & 2: 0
2. FEELING DOWN, DEPRESSED OR HOPELESS: 0

## 2019-01-31 ASSESSMENT — ENCOUNTER SYMPTOMS
BLOOD IN STOOL: 0
ABDOMINAL PAIN: 0
CHEST TIGHTNESS: 0
COUGH: 0
NAUSEA: 0
DIARRHEA: 0
SHORTNESS OF BREATH: 0
CONSTIPATION: 0
VOMITING: 0

## 2019-02-27 ENCOUNTER — TELEPHONE (OUTPATIENT)
Dept: FAMILY MEDICINE CLINIC | Age: 64
End: 2019-02-27

## 2019-03-11 DIAGNOSIS — Z79.4 TYPE 2 DIABETES MELLITUS WITH HYPERGLYCEMIA, WITH LONG-TERM CURRENT USE OF INSULIN (HCC): ICD-10-CM

## 2019-03-11 DIAGNOSIS — E11.65 TYPE 2 DIABETES MELLITUS WITH HYPERGLYCEMIA, WITH LONG-TERM CURRENT USE OF INSULIN (HCC): ICD-10-CM

## 2019-10-08 ENCOUNTER — OFFICE VISIT (OUTPATIENT)
Dept: CARDIOLOGY CLINIC | Age: 64
End: 2019-10-08
Payer: COMMERCIAL

## 2019-10-08 VITALS
HEIGHT: 67 IN | SYSTOLIC BLOOD PRESSURE: 130 MMHG | DIASTOLIC BLOOD PRESSURE: 80 MMHG | OXYGEN SATURATION: 98 % | BODY MASS INDEX: 29.66 KG/M2 | WEIGHT: 189 LBS | HEART RATE: 68 BPM

## 2019-10-08 DIAGNOSIS — I10 ESSENTIAL HYPERTENSION: ICD-10-CM

## 2019-10-08 DIAGNOSIS — I20.9 ANGINA, CLASS IV (HCC): ICD-10-CM

## 2019-10-08 DIAGNOSIS — I25.10 CORONARY ARTERY DISEASE INVOLVING NATIVE CORONARY ARTERY OF NATIVE HEART WITHOUT ANGINA PECTORIS: ICD-10-CM

## 2019-10-08 DIAGNOSIS — I20.9 ANGINA, CLASS III (HCC): ICD-10-CM

## 2019-10-08 DIAGNOSIS — E78.2 MIXED HYPERLIPIDEMIA: Primary | ICD-10-CM

## 2019-10-08 PROCEDURE — G8484 FLU IMMUNIZE NO ADMIN: HCPCS | Performed by: INTERNAL MEDICINE

## 2019-10-08 PROCEDURE — 99214 OFFICE O/P EST MOD 30 MIN: CPT | Performed by: INTERNAL MEDICINE

## 2019-10-08 PROCEDURE — 3017F COLORECTAL CA SCREEN DOC REV: CPT | Performed by: INTERNAL MEDICINE

## 2019-10-08 PROCEDURE — 1036F TOBACCO NON-USER: CPT | Performed by: INTERNAL MEDICINE

## 2019-10-08 PROCEDURE — G8427 DOCREV CUR MEDS BY ELIG CLIN: HCPCS | Performed by: INTERNAL MEDICINE

## 2019-10-08 PROCEDURE — G8417 CALC BMI ABV UP PARAM F/U: HCPCS | Performed by: INTERNAL MEDICINE

## 2019-10-08 PROCEDURE — G8598 ASA/ANTIPLAT THER USED: HCPCS | Performed by: INTERNAL MEDICINE

## 2019-10-08 RX ORDER — LOSARTAN POTASSIUM 25 MG/1
25 TABLET ORAL DAILY
Qty: 90 TABLET | Refills: 3 | Status: SHIPPED | OUTPATIENT
Start: 2019-10-08

## 2019-10-08 RX ORDER — CLOPIDOGREL BISULFATE 75 MG/1
75 TABLET ORAL DAILY
Qty: 90 TABLET | Refills: 3 | Status: SHIPPED | OUTPATIENT
Start: 2019-10-08

## 2019-10-08 RX ORDER — METOPROLOL SUCCINATE 25 MG/1
25 TABLET, EXTENDED RELEASE ORAL DAILY
Qty: 90 TABLET | Refills: 3 | Status: SHIPPED | OUTPATIENT
Start: 2019-10-08

## 2019-10-17 LAB
ANION GAP SERPL CALCULATED.3IONS-SCNC: 13 MMOL/L (ref 10–20)
BICARBONATE: 27 MMOL/L (ref 21–32)
CALCIUM SERPL-MCNC: 9 MG/DL (ref 8.6–10.3)
CHLORIDE BLD-SCNC: 97 MMOL/L (ref 98–107)
CREAT SERPL-MCNC: 1.08 MG/DL (ref 0.5–1.3)
ERYTHROCYTE [DISTWIDTH] IN BLOOD BY AUTOMATED COUNT: 12 % (ref 11.5–14)
GFR AFRICAN AMERICAN: >60 ML/MIN/1.73M2
GFR NON-AFRICAN AMERICAN: >60 ML/MIN/1.73M2
GLUCOSE: 418 MG/DL (ref 74–99)
HCT VFR BLD CALC: 43.5 % (ref 41–52)
HEMOGLOBIN: 14.7 G/DL (ref 13.5–17)
INR BLD: 0.8 (ref 0.9–1.1)
MCHC RBC AUTO-ENTMCNC: 33.8 G/DL (ref 32–36)
MCV RBC AUTO: 92 FL (ref 80–100)
PLATELET # BLD: 204 X10E9/L (ref 150–450)
POTASSIUM SERPL-SCNC: 4.2 MMOL/L (ref 3.5–5.3)
PROTHROMBIN TIME: 8.9 SEC (ref 9.7–12.7)
RBC # BLD: 4.73 X10E12/L (ref 4.5–5.9)
SODIUM BLD-SCNC: 133 MMOL/L (ref 136–145)
UREA NITROGEN: 26 MG/DL (ref 6–23)
WBC: 9 X10E9/L (ref 4.4–11.3)

## 2019-11-11 DIAGNOSIS — I20.9 ANGINA, CLASS IV (HCC): ICD-10-CM

## 2022-11-28 LAB
GLUCOSE BLD-MCNC: 192 MG/DL (ref 74–99)
GLUCOSE BLD-MCNC: 78 MG/DL (ref 74–99)

## 2022-11-29 LAB
GLUCOSE BLD-MCNC: 154 MG/DL (ref 74–99)
GLUCOSE BLD-MCNC: 201 MG/DL (ref 74–99)
GLUCOSE BLD-MCNC: 218 MG/DL (ref 74–99)
GLUCOSE BLD-MCNC: 249 MG/DL (ref 74–99)
GLUCOSE BLD-MCNC: 255 MG/DL (ref 74–99)

## 2022-11-30 LAB
GLUCOSE BLD-MCNC: 107 MG/DL (ref 74–99)
GLUCOSE BLD-MCNC: 290 MG/DL (ref 74–99)

## 2022-12-01 LAB
GLUCOSE BLD-MCNC: 119 MG/DL (ref 74–99)
GLUCOSE BLD-MCNC: 168 MG/DL (ref 74–99)
GLUCOSE BLD-MCNC: 186 MG/DL (ref 74–99)

## 2022-12-02 LAB
GLUCOSE BLD-MCNC: 166 MG/DL (ref 74–99)
GLUCOSE BLD-MCNC: 241 MG/DL (ref 74–99)
GLUCOSE BLD-MCNC: 89 MG/DL (ref 74–99)

## 2022-12-03 LAB
GLUCOSE BLD-MCNC: 120 MG/DL (ref 74–99)
GLUCOSE BLD-MCNC: 122 MG/DL (ref 74–99)
GLUCOSE BLD-MCNC: 230 MG/DL (ref 74–99)
GLUCOSE BLD-MCNC: 293 MG/DL (ref 74–99)

## 2022-12-04 LAB
GLUCOSE BLD-MCNC: 195 MG/DL (ref 74–99)
GLUCOSE BLD-MCNC: 236 MG/DL (ref 74–99)

## 2022-12-07 ENCOUNTER — OFFICE VISIT (OUTPATIENT)
Dept: GERIATRIC MEDICINE | Age: 67
End: 2022-12-07

## 2022-12-07 DIAGNOSIS — E78.2 MIXED HYPERLIPIDEMIA: ICD-10-CM

## 2022-12-07 DIAGNOSIS — G93.41 ACUTE METABOLIC ENCEPHALOPATHY: Primary | ICD-10-CM

## 2022-12-07 DIAGNOSIS — I10 ESSENTIAL HYPERTENSION: ICD-10-CM

## 2022-12-07 DIAGNOSIS — E11.65 TYPE 2 DIABETES MELLITUS WITH HYPERGLYCEMIA, UNSPECIFIED WHETHER LONG TERM INSULIN USE (HCC): ICD-10-CM

## 2022-12-07 DIAGNOSIS — E11.42 DIABETIC POLYNEUROPATHY ASSOCIATED WITH TYPE 2 DIABETES MELLITUS (HCC): ICD-10-CM

## 2022-12-08 ENCOUNTER — OFFICE VISIT (OUTPATIENT)
Dept: GERIATRIC MEDICINE | Age: 67
End: 2022-12-08

## 2022-12-08 DIAGNOSIS — N39.0 URINARY TRACT INFECTION WITHOUT HEMATURIA, SITE UNSPECIFIED: ICD-10-CM

## 2022-12-08 DIAGNOSIS — E87.1 HYPONATREMIA: Primary | ICD-10-CM

## 2022-12-08 DIAGNOSIS — I25.10 CORONARY ARTERY DISEASE INVOLVING NATIVE CORONARY ARTERY OF NATIVE HEART WITHOUT ANGINA PECTORIS: ICD-10-CM

## 2022-12-08 DIAGNOSIS — R45.851 SUICIDAL IDEATION: ICD-10-CM

## 2022-12-08 DIAGNOSIS — F32.9 MAJOR DEPRESSIVE DISORDER, REMISSION STATUS UNSPECIFIED, UNSPECIFIED WHETHER RECURRENT: ICD-10-CM

## 2022-12-08 DIAGNOSIS — E78.2 MIXED HYPERLIPIDEMIA: ICD-10-CM

## 2022-12-08 DIAGNOSIS — G93.41 ACUTE METABOLIC ENCEPHALOPATHY: ICD-10-CM

## 2022-12-08 DIAGNOSIS — E11.65 TYPE 2 DIABETES MELLITUS WITH HYPERGLYCEMIA, UNSPECIFIED WHETHER LONG TERM INSULIN USE (HCC): ICD-10-CM

## 2022-12-08 RX ORDER — ISOSORBIDE MONONITRATE 30 MG/1
30 TABLET, EXTENDED RELEASE ORAL DAILY
COMMUNITY

## 2022-12-08 RX ORDER — IBUPROFEN 400 MG/1
400 TABLET ORAL EVERY 6 HOURS PRN
COMMUNITY

## 2022-12-08 RX ORDER — FOLIC ACID 1 MG/1
1 TABLET ORAL DAILY
COMMUNITY

## 2022-12-08 RX ORDER — ELECTROLYTES/DEXTROSE
1 SOLUTION, ORAL ORAL DAILY
COMMUNITY

## 2022-12-08 RX ORDER — TAMSULOSIN HYDROCHLORIDE 0.4 MG/1
0.4 CAPSULE ORAL DAILY
COMMUNITY

## 2022-12-08 RX ORDER — GABAPENTIN 300 MG/1
300 CAPSULE ORAL 2 TIMES DAILY
COMMUNITY

## 2022-12-08 RX ORDER — DULOXETIN HYDROCHLORIDE 60 MG/1
60 CAPSULE, DELAYED RELEASE ORAL DAILY
COMMUNITY

## 2022-12-08 RX ORDER — DOCUSATE SODIUM 100 MG/1
100 CAPSULE, LIQUID FILLED ORAL 2 TIMES DAILY
COMMUNITY

## 2022-12-08 RX ORDER — PRASUGREL 10 MG/1
10 TABLET, FILM COATED ORAL DAILY
COMMUNITY

## 2022-12-08 RX ORDER — POLYETHYLENE GLYCOL 3350 17 G/17G
17 POWDER, FOR SOLUTION ORAL DAILY
COMMUNITY

## 2022-12-08 RX ORDER — HYDROCORTISONE ACETATE PRAMOXINE HCL 1; 1 G/100G; G/100G
CREAM TOPICAL 2 TIMES DAILY
COMMUNITY

## 2022-12-09 LAB
ANION GAP SERPL CALCULATED.3IONS-SCNC: 10 MEQ/L (ref 9–15)
BASOPHILS ABSOLUTE: 0 K/UL (ref 0–0.2)
BASOPHILS RELATIVE PERCENT: 0.4 %
BUN BLDV-MCNC: 15 MG/DL (ref 8–23)
CALCIUM SERPL-MCNC: 8.7 MG/DL (ref 8.5–9.9)
CHLORIDE BLD-SCNC: 101 MEQ/L (ref 95–107)
CHOLESTEROL, TOTAL: 156 MG/DL (ref 0–199)
CO2: 28 MEQ/L (ref 20–31)
CREAT SERPL-MCNC: 0.6 MG/DL (ref 0.7–1.2)
EOSINOPHILS ABSOLUTE: 0.2 K/UL (ref 0–0.7)
EOSINOPHILS RELATIVE PERCENT: 4.3 %
GFR SERPL CREATININE-BSD FRML MDRD: >60 ML/MIN/{1.73_M2}
GLUCOSE BLD-MCNC: 130 MG/DL (ref 70–99)
HBA1C MFR BLD: 12.5 % (ref 4.8–5.9)
HCT VFR BLD CALC: 39.8 % (ref 42–52)
HDLC SERPL-MCNC: 49 MG/DL (ref 40–59)
HEMOGLOBIN: 13.3 G/DL (ref 14–18)
LDL CHOLESTEROL CALCULATED: 92 MG/DL (ref 0–129)
LYMPHOCYTES ABSOLUTE: 1.7 K/UL (ref 1–4.8)
LYMPHOCYTES RELATIVE PERCENT: 29.8 %
MCH RBC QN AUTO: 32 PG (ref 27–31.3)
MCHC RBC AUTO-ENTMCNC: 33.4 % (ref 33–37)
MCV RBC AUTO: 96 FL (ref 79–92.2)
MONOCYTES ABSOLUTE: 0.5 K/UL (ref 0.2–0.8)
MONOCYTES RELATIVE PERCENT: 9.8 %
NEUTROPHILS ABSOLUTE: 3.1 K/UL (ref 1.4–6.5)
NEUTROPHILS RELATIVE PERCENT: 55.7 %
PDW BLD-RTO: 13.7 % (ref 11.5–14.5)
PLATELET # BLD: 252 K/UL (ref 130–400)
POTASSIUM SERPL-SCNC: 3.8 MEQ/L (ref 3.4–4.9)
RBC # BLD: 4.15 M/UL (ref 4.7–6.1)
SODIUM BLD-SCNC: 139 MEQ/L (ref 135–144)
TRIGL SERPL-MCNC: 75 MG/DL (ref 0–150)
TSH SERPL DL<=0.05 MIU/L-ACNC: 3.96 UIU/ML (ref 0.44–3.86)
WBC # BLD: 5.6 K/UL (ref 4.8–10.8)

## 2022-12-19 ENCOUNTER — OFFICE VISIT (OUTPATIENT)
Dept: GERIATRIC MEDICINE | Age: 67
End: 2022-12-19
Payer: COMMERCIAL

## 2022-12-19 DIAGNOSIS — I10 ESSENTIAL HYPERTENSION: ICD-10-CM

## 2022-12-19 DIAGNOSIS — E11.42 DIABETIC POLYNEUROPATHY ASSOCIATED WITH TYPE 2 DIABETES MELLITUS (HCC): ICD-10-CM

## 2022-12-19 DIAGNOSIS — G93.41 ACUTE METABOLIC ENCEPHALOPATHY: Primary | ICD-10-CM

## 2022-12-19 PROCEDURE — 1123F ACP DISCUSS/DSCN MKR DOCD: CPT | Performed by: INTERNAL MEDICINE

## 2022-12-19 PROCEDURE — G8484 FLU IMMUNIZE NO ADMIN: HCPCS | Performed by: INTERNAL MEDICINE

## 2022-12-19 PROCEDURE — 99305 1ST NF CARE MODERATE MDM 35: CPT | Performed by: INTERNAL MEDICINE

## 2022-12-19 PROCEDURE — 3046F HEMOGLOBIN A1C LEVEL >9.0%: CPT | Performed by: INTERNAL MEDICINE

## 2022-12-21 ENCOUNTER — OFFICE VISIT (OUTPATIENT)
Dept: GERIATRIC MEDICINE | Age: 67
End: 2022-12-21
Payer: COMMERCIAL

## 2022-12-21 DIAGNOSIS — G93.41 ACUTE METABOLIC ENCEPHALOPATHY: Primary | ICD-10-CM

## 2022-12-21 DIAGNOSIS — I10 ESSENTIAL HYPERTENSION: ICD-10-CM

## 2022-12-21 DIAGNOSIS — E11.42 DIABETIC POLYNEUROPATHY ASSOCIATED WITH TYPE 2 DIABETES MELLITUS (HCC): ICD-10-CM

## 2022-12-21 PROCEDURE — 3046F HEMOGLOBIN A1C LEVEL >9.0%: CPT | Performed by: INTERNAL MEDICINE

## 2022-12-21 PROCEDURE — G8484 FLU IMMUNIZE NO ADMIN: HCPCS | Performed by: INTERNAL MEDICINE

## 2022-12-21 PROCEDURE — 99309 SBSQ NF CARE MODERATE MDM 30: CPT | Performed by: INTERNAL MEDICINE

## 2022-12-21 PROCEDURE — 1123F ACP DISCUSS/DSCN MKR DOCD: CPT | Performed by: INTERNAL MEDICINE

## 2022-12-29 PROBLEM — F32.9 MAJOR DEPRESSIVE DISORDER: Status: ACTIVE | Noted: 2022-12-29

## 2022-12-29 PROBLEM — E87.1 HYPONATREMIA: Status: ACTIVE | Noted: 2022-12-29

## 2022-12-29 PROBLEM — N39.0 URINARY TRACT INFECTION WITHOUT HEMATURIA: Status: ACTIVE | Noted: 2022-12-29

## 2022-12-29 PROBLEM — E11.65 TYPE 2 DIABETES MELLITUS WITH HYPERGLYCEMIA (HCC): Status: ACTIVE | Noted: 2022-12-29

## 2022-12-29 PROBLEM — R45.851 SUICIDAL IDEATION: Status: ACTIVE | Noted: 2022-12-29

## 2022-12-29 PROBLEM — G93.41 ACUTE METABOLIC ENCEPHALOPATHY: Status: ACTIVE | Noted: 2022-12-29

## 2022-12-29 NOTE — PROGRESS NOTES
Nursing Home:  [address] [place] [address]    The patient is being seen today for follow-up after recent admission to this facility for:  Chief Complaint   Patient presents with    Follow-Up from Hospital         SUBJECTIVE: Patient being seen today for follow-up after recent admission to this facility from the hospital with primary diagnosis of hyponatremia, suicidal ideation, UTI, type 2 diabetes with hyperglycemia, major depressive disorder, acute metabolic encephalopathy, CAD without angina, and hyperlipidemia. FAMILY AND SOCIAL HISTORY:  Refer to H&P. Past Medical History:   Diagnosis Date    Anxiety     CAD (coronary artery disease)     Depression     Essential hypertension 10/13/2015    Gastroesophageal reflux disease 9/30/2015    Hyperlipidemia     Sciatica 1/27/2012    Type II or unspecified type diabetes mellitus without mention of complication, not stated as uncontrolled      Family History   Problem Relation Age of Onset    High Blood Pressure Mother     Diabetes Father     Heart Disease Father      Social History     Socioeconomic History    Marital status:      Spouse name: Not on file    Number of children: Not on file    Years of education: Not on file    Highest education level: Not on file   Occupational History    Not on file   Tobacco Use    Smoking status: Never    Smokeless tobacco: Never   Substance and Sexual Activity    Alcohol use: No     Alcohol/week: 0.0 standard drinks    Drug use: No    Sexual activity: Not on file   Other Topics Concern    Not on file   Social History Narrative    Not on file     Social Determinants of Health     Financial Resource Strain: Not on file   Food Insecurity: Not on file   Transportation Needs: Not on file   Physical Activity: Not on file   Stress: Not on file   Social Connections: Not on file   Intimate Partner Violence: Not on file   Housing Stability: Not on file     ALLERGIES:  Patient has no known allergies.     MEDICATIONS: Acetaminophen 325 mg 2 tabs p.o. every 4 as needed pain or fever greater than 100, ASA 81 mg tab p.o. daily, atorvastatin calcium 40 mg tab p.o. daily, docusate sodium 100 mg p.o. twice daily, Dulcolax suppository 10 mg rectal suppository as needed if no relief from milk of mag, duloxetine delayed release capsule 60 mg capsule p.o. daily, Flomax 0.4 mg capsule 1 capsule p.o. daily, folic acid 1 mg tab p.o. daily, gabapentin 300 mg capsule p.o. twice daily, [place] liquid 100 mg per 5 mL give 10 mL p.o. every 4 as needed, GlycoLax powder 17 g per scoop 1 scoop p.o. daily, hydrocortisone Ace pramoxine cream 1-1% apply to rectum topically 3 times daily, ibuprofen 400 mg tab p.o. every 6 as needed pain, glargine solution insulin pen injector 100 units/mL inject 20 units subcu twice daily, insulin lispro solution 100 units/mL inject 15 units subcu daily, isosorbide mononitrate extended release tab 30 mg tab p.o. daily, Lantus Solostar 100 units/mL solution pen injector inject 20 units subcu twice daily, losartan potassium 25 mg tab give one half tab p.o. daily for hypertension hold for systolic BP less than 706, metformin 100 mg tab p.o. twice daily, metoprolol tartrate 12.5 mg tab p.o. twice daily hold for systolic BP less than 766 or heart rate less than 60, milk of magnesia suspension 1200 mg per 15 mL give 30 mL p.o. as needed, multivitamin tablet 1 tablet p.o. daily, Mylanta suspension 200-200-20 milligrams per 5 mL give 30 mL p.o. every 4 as needed, pantoprazole sodium delayed release tab 20 mg tab p.o. daily, para surgical 10 mg tab p.o. daily, vitamin B12 tablet 1000 MCG tab p.o. daily, which leif pad applied to rectal area topically as needed after bowel movement for rectal pain. REVIEW OF SYSTEMS:  Resident denies any headache, dizziness, blurred vision. He denies any fever, chills, sore throat. He denies any rashes, bruises, or open areas.   He denies any chest pain, chest discomfort, or heart palpitations. He denies any shortness of breath or cough. He denies any nausea, vomiting, or abdominal pain. He denies any urinary urgency, frequency, or dysuria. He denies any constipation or diarrhea. He states he is eating okay, sleeping okay, and he currently has no pain. Patient is concerned about having his Knight catheter removed stating that he is afraid they can have to put it back in and he does not want to have trauma related to this or another urinary tract infection. PHYSICAL EXAMINATION:  Most recent vital signs: Blood pressure 153/78, temp 97.9, heart rate 87, respirations 18, pulse ox 99% room air. Constitutional:  Resident is a 74 y.o. male alert, pleasant, and cooperative with exam.  In no apparent distress. Integument:  Pink, warm, dry. No visible rashes, bruises, or open areas. HEENT:  Normocephalic, atraumatic. External ears appear to be within normal limits. [place]. Conjunctivae pink. Sclerae nonicteric. Mucous membranes pink, moist.  No oropharyngeal exudate. Neck:  Supple. No cervical or clavicular lymphadenopathy. No masses noted. Cardiovascular:  Heart rate regular rate and rhythm. No murmurs, gallops, rubs noted. Respiratory:  Lung sounds Normal.  Respirations nonlabored. The patient is not using accessory muscles with breathing. Current pulse ox 99% room air. Abdomen:  Soft, nontender, nondistended, normoactive bowel sounds. No masses noted. Musculoskeletal: No muscle tenderness. No joint tenderness. : Knight to cd clear yellow urine  PV:  Peripheral pulses present. He  does not have  edema to BLE. Psychological: Mood stable. Affect pleasant. Speech normal rate and tone. ASSESSMENT AND PLAN:      Diagnosis Orders   1. Hyponatremia        2. Suicidal ideation        3. Urinary tract infection without hematuria, site unspecified        4. Type 2 diabetes mellitus with hyperglycemia, unspecified whether long term insulin use ([address]        5.  Major depressive disorder, remission status unspecified, unspecified whether recurrent        6. Acute metabolic encephalopathy        7. Coronary artery disease involving native coronary artery of native heart without angina pectoris        8. Mixed hyperlipidemia             Most recent sodium 141. BMP in am.  Patient seen by psychiatry and cleared in hospital. Patient has not had any further episodes of suicidal ideation since his admission to the facility. He appears to be in good spirits and is very cooperative with nursing staff. Resolved. Patient concerned about having weiss catheter removed. He is concerned he will need to have it reinserted and get another UTI. I have assured the patient that although it is a possibility to get a UTI from catheter reinsertion he is better off having weiss catheter removed as ordered on 12/14/22 with voiding trial, since the catheter itself being in place is a source of a possible infection. HgbA1c in am. Continue current diabetic medication regimen. Mood stable. Patient has not had any depressive episodes since his admission to the facility. Psych NP may see patient if needed. Patient has returned to his baseline cognition. We will continue to monitor closely. No reports of chest pain or chest discomfort since admission. Lipid panel in the morning. Continue statin therapy as ordered. I have reviewed this resident's medication and treatment plan as well as most recent lab work. BMP, CBC with diff, HgbA1c, lipid panel in am. PT/OT eval and treat. No further changes will be made at this time. Return in about 1 week (around 12/15/2022), or if symptoms worsen or fail to improve. Please note this report is partially produced by using speech recognition hardware. It may contain errors related to the system, including grammar, punctuation and spelling as well as words and phrases that may seem inaccurate.   For any questions or concerns feel free to contact me for clarification        Electronically Signed By: Oriana Galeas. Daquan Kaye CNP on 12/29/22   ______________________________  Oriana Galeas.  Phuc RUTLEDGE CNP

## 2022-12-30 NOTE — PROGRESS NOTES
Patient Name: Melanie Storey    YOB: 1955  Medical Record Number: [id number]        History of Present Illness:  13year-old woman with past medical history including diabetes coronary hypertension. Patient presents with typical suicidal ideation patient family brought him to ER evaluation patient is a diabetic patient had evidence of functional decline weakness but there is concern for possibility of noncompliance with his diabetic regimen. Patient was hospitalized did undergo neurological imaging did undergo eval for poss acute infection evidence of infections or course of antibiotic therapy. Patient did have hyperglycemia. He was given insulin therapy due to hyponatremia in the setting of hyperglycemia did increment appropriately. Patient was seen by psychiatry suicidal interventions were performed patient's cognition improved remained globally weak at that time patient was subsidy transferred here for ongoing course of care. Review of Systems   Unable to perform ROS: Mental status change   All other systems reviewed and are negative. Review of Systems: All 14 review of systems negative other than as stated above    Social History     Tobacco Use    Smoking status: Never    Smokeless tobacco: Never   Substance Use Topics    Alcohol use: No     Alcohol/week: 0.0 standard drinks    Drug use: No         Past Medical History:   Diagnosis Date    Anxiety     CAD (coronary artery disease)     Depression     Essential hypertension 10/13/2015    Gastroesophageal reflux disease 9/30/2015    Hyperlipidemia     Sciatica 1/27/2012    Type II or unspecified type diabetes mellitus without mention of complication, not stated as uncontrolled            Past Surgical History:   Procedure Laterality Date    APPENDECTOMY      CARDIAC CATHETERIZATION  08/28/2018    DR. OROURKE    CARDIAC CATHETERIZATION  10/17/2019    DR HOLIDAY         Current Outpatient Medications on File Prior to Visit   Medication Sig Dispense Refill    losartan (COZAAR) 25 MG tablet Take 1 tablet by mouth daily (Patient taking differently: Take 12.5 mg by mouth daily Indications: High Blood Pressure Disorder HOLD for SBP<110) 90 tablet 3    metoprolol succinate (TOPROL XL) 25 MG extended release tablet Take 1 tablet by mouth daily (Patient taking differently: Take 12.5 mg by mouth in the morning and 12.5 mg in the evening. Indications: High Blood Pressure Disorder. Hold for SBP<110 or HR<60.) 90 tablet 3    insulin glargine (LANTUS SOLOSTAR) 100 UNIT/ML injection pen INJECT 50 UNITS IN THE MORNING AND 45 UNITS IN THE EVENING (Patient taking differently: Inject 20 Units into the skin 2 times daily Indications: Diabetes) 45 mL 0    blood glucose test strips (GLUCOSE METER TEST) strip TEST 4 TIMES DAILY PRN DX:E11.9 100 strip 3    atorvastatin (LIPITOR) 80 MG tablet TAKE 1 TABLET EVERY DAY (Patient taking differently: Take 40 mg by mouth daily Indications: High Amount of Fats in the Blood TAKE 1 TABLET EVERY DAY) 90 tablet 3    glucose (GLUTOSE 15) 40 % GEL Take 37.5 mLs by mouth See Admin Instructions 45 g 3    Insulin Pen Needle ([employer] PEN NEEDLES 29G) 29G X 12MM MISC 1 each by Does not apply route daily 100 each 5    Insulin Syringe-Needle U-100 ([employer] INS SYR .3CC/29G) 29G X 1/2\" 0.3 ML MISC 1 each by Does not apply route daily 100 each 3    metFORMIN (GLUCOPHAGE) 1000 MG tablet Take 1 tablet by mouth 2 times daily (with meals) (Patient taking differently: Take 1,000 mg by mouth 2 times daily (with meals) Indications: Diabetes) 180 tablet 3    nitroGLYCERIN (NITROSTAT) 0.4 MG SL tablet As needed for chest pain may repeat every 5 minutes 3 times, if no relief go to emergency room.  25 tablet 0    pantoprazole (PROTONIX) 20 MG tablet TAKE 1 TABLET EVERY DAY IN THE AM (Patient taking differently: Take 20 mg by mouth daily Indications: Gastroesophageal Reflux Disease TAKE 1 TABLET EVERY DAY IN THE AM) 30 tablet 5    aspirin 81 MG tablet Take 1 tablet by mouth daily (Patient taking differently: Take 81 mg by mouth daily Indications: Thickening and Hardening of Heart Arteries) 90 tablet 3    Blood Glucose Monitoring Suppl (BLOOD GLUCOSE MONITOR SYSTEM) w/Device KIT USE AS DIRECTED DX:E11.9 on insulin 1 kit 0    Lancets MISC TEST 4 TIMES DAILY PRN DX:E11.9 on insulin 100 each 3    Blood Glucose Monitoring Suppl SAMEER 1 Device by Does not apply route daily 1 Device 0     No current facility-administered medications on file prior to visit. No Known Allergies      Family History   Problem Relation Age of Onset    High Blood Pressure Mother     Diabetes Father     Heart Disease Father          Physical Exam:      Physical Exam  Vitals and nursing note reviewed. Constitutional:       Appearance: Normal appearance. He is normal weight. HENT:      Head: Normocephalic and atraumatic. Mouth/Throat:      Pharynx: No posterior oropharyngeal erythema. Cardiovascular:      Rate and Rhythm: Normal rate. Rhythm irregular. Heart sounds: Normal heart sounds. Pulmonary:      Breath sounds: Normal breath sounds. No wheezing. Abdominal:      General: Bowel sounds are normal. There is no distension. Palpations: Abdomen is soft. Musculoskeletal:         General: Swelling present. Normal range of motion. Cervical back: Normal range of motion. Skin:     General: Skin is warm. Findings: Bruising present. Neurological:      General: No focal deficit present. Motor: Weakness present. Psychiatric:         Mood and Affect: Mood normal.       There were no vitals taken for this visit.       .   Lab Results   Component Value Date    WBC 5.6 12/09/2022    HGB 13.3 (L) 12/09/2022    HCT 39.8 (L) 12/09/2022    MCV 96.0 (H) 12/09/2022     12/09/2022     Lab Results   Component Value Date/Time     12/09/2022 09:02 AM    K 3.8 12/09/2022 09:02 AM     12/09/2022 09:02 AM    CO2 28 12/09/2022 09:02 AM    BUN 15 12/09/2022 09:02 AM    CREATININE 0.60 12/09/2022 09:02 AM    GLUCOSE 130 12/09/2022 09:02 AM    GLUCOSE 115 12/06/2022 07:01 AM    CALCIUM 8.7 12/09/2022 09:02 AM                ASSESSMENT:  Patient Active Problem List   Diagnosis    Gastroesophageal reflux disease    Essential hypertension    Anxiety    Coronary artery disease involving native coronary artery of native heart without angina pectoris    Diabetic neuropathy (HCC)    Hyperlipidemia    Presence of stent in coronary artery    Sciatica    Dysuria    Hyponatremia    Suicidal ideation    Urinary tract infection without hematuria    Type 2 diabetes mellitus with hyperglycemia ([address]    Major depressive disorder    Acute metabolic encephalopathy         PLAN:   Diagnosis Orders   1. Acute metabolic encephalopathy        2. Diabetic polyneuropathy associated with type 2 diabetes mellitus ([address]        3. Essential hypertension        4. Type 2 diabetes mellitus with hyperglycemia, unspecified whether long term insulin use ([facility] [address]        5. Mixed hyperlipidemia          Course additional support continue with blood sugar management monitor mood state follow-up psychiatry as needed. Repeat liver function test follow-up A1c BP pending at this time. Goal vaccination functional status. Please note orders entered on site at facility after discussion with appropriate facility nursing/therapy/ / nutritional staff. Current longstanding medical problems and acute medical issues addressed with staff. Available data and data elements in on site paper chart reviewed and analyzed. Current external consultant notes reviewed in on site chart. Ordered laboratory testing and imaging will be reviewed when available. This patient's need for psychiatric medication has been reviewed. Will consider further adjustment and possible further evaluations by mental health services.

## 2023-01-10 ASSESSMENT — ENCOUNTER SYMPTOMS
ABDOMINAL PAIN: 1
COUGH: 0

## 2023-01-10 NOTE — PROGRESS NOTES
Patient Name: Christian Bonds    YOB: 1955  Medical Record Number: [id number]      History of Present Illness:  82-year-old gentleman was admitted here after recent hospitalization. Patient has multiple comorbidities including diabetes hypertension patient had cognitive impairment with suicidal ideation patient was hospitalized did undergo medical evaluation medications were adjusted patient did have emesis and weakness patient was seen by psychiatric services patient was eval possible acute DKA renal function monitored closely. Consider possible pneumonia started course antibacterial patient was stabilized patient CT imaging head. Pancreatic inflammatory stranding being considered acute pancreatitis? .  Patient had cholelithiasis without evidence of acute cholecystitis. No acute intracranial bleed. Patient was stabilized his cognition improved with no further evidence of acute suicidal ideation mentation. Patient just by psychiatry. Patient was simply transferred here for course rehabilitation. Today in his room pleasant globally weak. No new abdominal pain. Review of Systems   Constitutional:  Positive for activity change, appetite change and fatigue. HENT:  Negative for congestion. Respiratory:  Negative for cough. Gastrointestinal:  Positive for abdominal pain. Skin:  Negative for rash. Neurological:  Positive for weakness. Psychiatric/Behavioral:  Positive for confusion. All other systems reviewed and are negative.     Review of Systems: All 14 review of systems negative other than as stated above    Social History     Tobacco Use    Smoking status: Never    Smokeless tobacco: Never   Substance Use Topics    Alcohol use: No     Alcohol/week: 0.0 standard drinks    Drug use: No         Past Medical History:   Diagnosis Date    Anxiety     CAD (coronary artery disease)     Depression     Essential hypertension 10/13/2015    Gastroesophageal reflux disease 9/30/2015 Hyperlipidemia     Sciatica 1/27/2012    Type II or unspecified type diabetes mellitus without mention of complication, not stated as uncontrolled            Past Surgical History:   Procedure Laterality Date    APPENDECTOMY      CARDIAC CATHETERIZATION  08/28/2018    DR. Teixeira [facility]  10/17/2019    DR HOLIDAY         Current Outpatient Medications on File Prior to Visit   Medication Sig Dispense Refill    docusate sodium (COLACE) 100 MG capsule Take 100 mg by mouth 2 times daily Indications: Constipation      insulin lispro (HUMALOG) 100 UNIT/ML injection vial Inject 15 Units into the skin Daily Indications: Diabetes      DULoxetine (CYMBALTA) 60 MG extended release capsule Take 60 mg by mouth daily Indications: Depression      tamsulosin (FLOMAX) 0.4 MG capsule Take 0.4 mg by mouth daily Indications: Benign Enlargement of Prostate      folic acid (FOLVITE) 1 MG tablet Take 1 mg by mouth daily Indications: Nutritional Support      gabapentin (NEURONTIN) 300 MG capsule Take 300 mg by mouth in the morning and 300 mg in the evening. Indications: Nerve Disease. polyethylene glycol (GLYCOLAX) 17 GM/SCOOP powder Take 17 g by mouth daily Indications: Constipation      hydrocortisone ace-pramoxine (ANALPRAM-HC) 1-1 % cream Apply topically 2 times daily Indications: Hemorrhoids TID      ibuprofen (ADVIL;MOTRIN) 400 MG tablet Take 400 mg by mouth every 6 hours as needed for Pain      Multiple Vitamin (MULTIVITAMIN ADULT) TABS Take 1 tablet by mouth Daily Indications: Nutritional Support      isosorbide mononitrate (IMDUR) 30 MG extended release tablet Take 30 mg by mouth daily Indications: Thickening and Hardening of Heart Arteries      prasugrel (EFFIENT) 10 MG TABS Take 10 mg by mouth daily Indications:  Thickening and Hardening of Heart Arteries      cyanocobalamin 1000 MCG tablet Take 1,000 mcg by mouth daily Indications: Anemia      Pre-Moistened Witch Hazel 50 % PADS Apply topically as needed For use post  bowel movement      losartan (COZAAR) 25 MG tablet Take 1 tablet by mouth daily (Patient taking differently: Take 12.5 mg by mouth daily Indications: High Blood Pressure Disorder HOLD for SBP<110) 90 tablet 3    metoprolol succinate (TOPROL XL) 25 MG extended release tablet Take 1 tablet by mouth daily (Patient taking differently: Take 12.5 mg by mouth in the morning and 12.5 mg in the evening. Indications: High Blood Pressure Disorder. Hold for SBP<110 or HR<60.) 90 tablet 3    insulin glargine (LANTUS SOLOSTAR) 100 UNIT/ML injection pen INJECT 50 UNITS IN THE MORNING AND 45 UNITS IN THE EVENING (Patient taking differently: Inject 20 Units into the skin 2 times daily Indications: Diabetes) 45 mL 0    blood glucose test strips (GLUCOSE METER TEST) strip TEST 4 TIMES DAILY PRN DX:E11.9 100 strip 3    atorvastatin (LIPITOR) 80 MG tablet TAKE 1 TABLET EVERY DAY (Patient taking differently: Take 40 mg by mouth daily Indications: High Amount of Fats in the Blood TAKE 1 TABLET EVERY DAY) 90 tablet 3    glucose (GLUTOSE 15) 40 % GEL Take 37.5 mLs by mouth See Admin Instructions 45 g 3    Insulin Pen Needle ([employer] PEN NEEDLES 29G) 29G X 12MM MISC 1 each by Does not apply route daily 100 each 5    Insulin Syringe-Needle U-100 ([employer] INS SYR .3CC/29G) 29G X 1/2\" 0.3 ML MISC 1 each by Does not apply route daily 100 each 3    metFORMIN (GLUCOPHAGE) 1000 MG tablet Take 1 tablet by mouth 2 times daily (with meals) (Patient taking differently: Take 1,000 mg by mouth 2 times daily (with meals) Indications: Diabetes) 180 tablet 3    nitroGLYCERIN (NITROSTAT) 0.4 MG SL tablet As needed for chest pain may repeat every 5 minutes 3 times, if no relief go to emergency room.  25 tablet 0    pantoprazole (PROTONIX) 20 MG tablet TAKE 1 TABLET EVERY DAY IN THE AM (Patient taking differently: Take 20 mg by mouth daily Indications: Gastroesophageal Reflux Disease TAKE 1 TABLET EVERY DAY IN THE AM) 30 tablet 5    aspirin 81 MG tablet Take 1 tablet by mouth daily (Patient taking differently: Take 81 mg by mouth daily Indications: Thickening and Hardening of Heart Arteries) 90 tablet 3    Blood Glucose Monitoring Suppl (BLOOD GLUCOSE MONITOR SYSTEM) w/Device KIT USE AS DIRECTED DX:E11.9 on insulin 1 kit 0    Lancets MISC TEST 4 TIMES DAILY PRN DX:E11.9 on insulin 100 each 3    Blood Glucose Monitoring Suppl SAMEER 1 Device by Does not apply route daily 1 Device 0     No current facility-administered medications on file prior to visit. No Known Allergies      Family History   Problem Relation Age of Onset    High Blood Pressure Mother     Diabetes Father     Heart Disease Father          Physical Exam:      Physical Exam  Vitals reviewed. Constitutional:       Appearance: Normal appearance. He is normal weight. HENT:      Head: Normocephalic and atraumatic. Cardiovascular:      Rate and Rhythm: Normal rate. Pulses: Normal pulses. Pulmonary:      Effort: Pulmonary effort is normal.      Breath sounds: No rhonchi. Abdominal:      General: Bowel sounds are normal.      Palpations: Abdomen is soft. Tenderness: There is no abdominal tenderness. Musculoskeletal:      Cervical back: Normal range of motion. Lymphadenopathy:      Cervical: No cervical adenopathy. Skin:     Findings: Bruising present. Neurological:      Mental Status: Mental status is at baseline. Motor: No weakness. Psychiatric:         Mood and Affect: Mood normal.       There were no vitals taken for this visit.       .   Lab Results   Component Value Date    WBC 5.6 12/09/2022    HGB 13.3 (L) 12/09/2022    HCT 39.8 (L) 12/09/2022    MCV 96.0 (H) 12/09/2022     12/09/2022     Lab Results   Component Value Date/Time     12/09/2022 09:02 AM    K 3.8 12/09/2022 09:02 AM     12/09/2022 09:02 AM    CO2 28 12/09/2022 09:02 AM    BUN 15 12/09/2022 09:02 AM    CREATININE 0.60 12/09/2022 09:02 AM    GLUCOSE 130 12/09/2022 09:02 AM GLUCOSE 115 12/06/2022 07:01 AM    CALCIUM 8.7 12/09/2022 09:02 AM                ASSESSMENT:  Patient Active Problem List   Diagnosis    Gastroesophageal reflux disease    Essential hypertension    Anxiety    Coronary artery disease involving native coronary artery of native heart without angina pectoris    Diabetic neuropathy (HCC)    Hyperlipidemia    Presence of stent in coronary artery    Sciatica    Dysuria    Hyponatremia    Suicidal ideation    Urinary tract infection without hematuria    Type 2 diabetes mellitus with hyperglycemia (HCC)    Major depressive disorder    Acute metabolic encephalopathy         PLAN:   Diagnosis Orders   1. Acute metabolic encephalopathy        2. Diabetic polyneuropathy associated with type 2 diabetes mellitus ([facility] [address]        3. Essential hypertension        Her mood state. Continue reorientation as able. Continue follow-up psychiatry. Functional assessments. Repeat BMP CBC liver function test pending monitor blood sugars. Monitoring for possible pancreatitis acutely. As well as possibly cholecystitis. Functionally weak below baseline goal return to the community. Please note orders entered on site at facility after discussion with appropriate facility nursing/therapy/ / nutritional staff. Current longstanding medical problems and acute medical issues addressed with staff. Available data and data elements in on site paper chart reviewed and analyzed. Current external consultant notes reviewed in on site chart. Ordered laboratory testing and imaging will be reviewed when available. This patient's need for psychiatric medication has been reviewed. Will consider further adjustment and possible further evaluations by mental health services.

## 2023-01-16 RX ORDER — INSULIN GLARGINE 100 [IU]/ML
INJECTION, SOLUTION SUBCUTANEOUS
Qty: 10 ML | OUTPATIENT
Start: 2023-01-16

## 2023-01-16 NOTE — PROGRESS NOTES
SUBJECTIVE:  73-year-old gentleman significant history of the baseline history, noted impairment diabetes hypertension. Patient has been function stable monitor renal function      ROS: Confused at baseline  The rest of the 14 point ROS negative    PHYSICAL EXAM: VSS per facility record  Oriented x1-2 pupils are small they are reactive oral mucosa moist chest showed no crackles or wheezing cardiovascular showed an irregular rate abdomen soft nontender extremity trace edema skin symptoms no rash    ASSESSMENT & PLAN:   Diagnosis Orders   1. Acute metabolic encephalopathy        2. Diabetic polyneuropathy associated with type 2 diabetes mellitus ([address]        3. Essential hypertension            Supportive care monitor blood sugars closely. Reorientation as able monitor systolic pressure. Repeat A1c BMP pending          Past Medical History:   Diagnosis Date    Anxiety     CAD (coronary artery disease)     Depression     Essential hypertension 10/13/2015    Gastroesophageal reflux disease 9/30/2015    Hyperlipidemia     Sciatica 1/27/2012    Type II or unspecified type diabetes mellitus without mention of complication, not stated as uncontrolled          Past Surgical History:   Procedure Laterality Date    APPENDECTOMY      CARDIAC CATHETERIZATION  08/28/2018    DR. Teixeira [facility]  10/17/2019    DR HOLIDAY         Current Outpatient Medications on File Prior to Visit   Medication Sig Dispense Refill    docusate sodium (COLACE) 100 MG capsule Take 100 mg by mouth 2 times daily Indications: Constipation      insulin lispro (HUMALOG) 100 UNIT/ML injection vial Inject 15 Units into the skin Daily Indications: Diabetes      DULoxetine (CYMBALTA) 60 MG extended release capsule Take 60 mg by mouth daily Indications: Depression      tamsulosin (FLOMAX) 0.4 MG capsule Take 0.4 mg by mouth daily Indications: Benign Enlargement of Prostate      folic acid (FOLVITE) 1 MG tablet Take 1 mg by mouth daily Indications: Nutritional Support      gabapentin (NEURONTIN) 300 MG capsule Take 300 mg by mouth in the morning and 300 mg in the evening. Indications: Nerve Disease. polyethylene glycol (GLYCOLAX) 17 GM/SCOOP powder Take 17 g by mouth daily Indications: Constipation      hydrocortisone ace-pramoxine (ANALPRAM-HC) 1-1 % cream Apply topically 2 times daily Indications: Hemorrhoids TID      ibuprofen (ADVIL;MOTRIN) 400 MG tablet Take 400 mg by mouth every 6 hours as needed for Pain      Multiple Vitamin (MULTIVITAMIN ADULT) TABS Take 1 tablet by mouth Daily Indications: Nutritional Support      isosorbide mononitrate (IMDUR) 30 MG extended release tablet Take 30 mg by mouth daily Indications: Thickening and Hardening of Heart Arteries      prasugrel (EFFIENT) 10 MG TABS Take 10 mg by mouth daily Indications: Thickening and Hardening of Heart Arteries      cyanocobalamin 1000 MCG tablet Take 1,000 mcg by mouth daily Indications: Anemia      Pre-Moistened Witch Hazel 50 % PADS Apply topically as needed For use post  bowel movement      losartan (COZAAR) 25 MG tablet Take 1 tablet by mouth daily (Patient taking differently: Take 12.5 mg by mouth daily Indications: High Blood Pressure Disorder HOLD for SBP<110) 90 tablet 3    metoprolol succinate (TOPROL XL) 25 MG extended release tablet Take 1 tablet by mouth daily (Patient taking differently: Take 12.5 mg by mouth in the morning and 12.5 mg in the evening. Indications: High Blood Pressure Disorder.  Hold for SBP<110 or HR<60.) 90 tablet 3    insulin glargine (LANTUS SOLOSTAR) 100 UNIT/ML injection pen INJECT 50 UNITS IN THE MORNING AND 45 UNITS IN THE EVENING (Patient taking differently: Inject 20 Units into the skin 2 times daily Indications: Diabetes) 45 mL 0    blood glucose test strips (GLUCOSE METER TEST) strip TEST 4 TIMES DAILY PRN DX:E11.9 100 strip 3    atorvastatin (LIPITOR) 80 MG tablet TAKE 1 TABLET EVERY DAY (Patient taking differently: Take 40 mg by mouth daily Indications: High Amount of Fats in the Blood TAKE 1 TABLET EVERY DAY) 90 tablet 3    glucose (GLUTOSE 15) 40 % GEL Take 37.5 mLs by mouth See Admin Instructions 45 g 3    Insulin Pen Needle ([employer] PEN NEEDLES 29G) 29G X 12MM MISC 1 each by Does not apply route daily 100 each 5    Insulin Syringe-Needle U-100 ([employer] INS SYR .3CC/29G) 29G X 1/2\" 0.3 ML MISC 1 each by Does not apply route daily 100 each 3    metFORMIN (GLUCOPHAGE) 1000 MG tablet Take 1 tablet by mouth 2 times daily (with meals) (Patient taking differently: Take 1,000 mg by mouth 2 times daily (with meals) Indications: Diabetes) 180 tablet 3    nitroGLYCERIN (NITROSTAT) 0.4 MG SL tablet As needed for chest pain may repeat every 5 minutes 3 times, if no relief go to emergency room. 25 tablet 0    pantoprazole (PROTONIX) 20 MG tablet TAKE 1 TABLET EVERY DAY IN THE AM (Patient taking differently: Take 20 mg by mouth daily Indications: Gastroesophageal Reflux Disease TAKE 1 TABLET EVERY DAY IN THE AM) 30 tablet 5    aspirin 81 MG tablet Take 1 tablet by mouth daily (Patient taking differently: Take 81 mg by mouth daily Indications: Thickening and Hardening of Heart Arteries) 90 tablet 3    Blood Glucose Monitoring Suppl (BLOOD GLUCOSE MONITOR SYSTEM) w/Device KIT USE AS DIRECTED DX:E11.9 on insulin 1 kit 0    Lancets MISC TEST 4 TIMES DAILY PRN DX:E11.9 on insulin 100 each 3    Blood Glucose Monitoring Suppl SAMEER 1 Device by Does not apply route daily 1 Device 0     No current facility-administered medications on file prior to visit.          Family History   Problem Relation Age of Onset    High Blood Pressure Mother     Diabetes Father     Heart Disease Father        Social History     Socioeconomic History    Marital status:      Spouse name: Not on file    Number of children: Not on file    Years of education: Not on file    Highest education level: Not on file   Occupational History    Not on file   Tobacco Use    Smoking status: Never    Smokeless tobacco: Never   Substance and Sexual Activity    Alcohol use: No     Alcohol/week: 0.0 standard drinks    Drug use: No    Sexual activity: Not on file   Other Topics Concern    Not on file   Social History Narrative    Not on file     Social Determinants of Health     Financial Resource Strain: Not on file   Food Insecurity: Not on file   Transportation Needs: Not on file   Physical Activity: Not on file   Stress: Not on file   Social Connections: Not on file   Intimate Partner Violence: Not on file   Housing Stability: Not on file         Lab Results   Component Value Date    LABA1C 12.5 (H) 12/09/2022     No results found for: EAG    Lab Results   Component Value Date/Time     12/09/2022 09:02 AM    K 3.8 12/09/2022 09:02 AM     12/09/2022 09:02 AM    CO2 28 12/09/2022 09:02 AM    BUN 15 12/09/2022 09:02 AM    CREATININE 0.60 12/09/2022 09:02 AM    GLUCOSE 130 12/09/2022 09:02 AM    GLUCOSE 115 12/06/2022 07:01 AM    CALCIUM 8.7 12/09/2022 09:02 AM        Lab Results   Component Value Date    CHOL 156 12/09/2022    CHOL 112 01/29/2019    CHOL 100 08/27/2018     Lab Results   Component Value Date    TRIG 75 12/09/2022    TRIG 196 01/29/2019    TRIG 152 (A) 08/27/2018     Lab Results   Component Value Date    HDL 49 12/09/2022    HDL 36 (L) 01/29/2019    HDL 30 (A) 08/27/2018     Lab Results   Component Value Date    LDLCALC 92 12/09/2022    LDLCALC 37 01/29/2019    LDLCALC 40 08/27/2018     Lab Results   Component Value Date    LABVLDL 30 (A) 08/27/2018     Lab Results   Component Value Date    CHOLHDLRATIO 3.3 08/27/2018       Lab Results   Component Value Date    TSH 3.960 (H) 12/09/2022       Lab Results   Component Value Date    WBC 5.6 12/09/2022    HGB 13.3 (L) 12/09/2022    HCT 39.8 (L) 12/09/2022    MCV 96.0 (H) 12/09/2022     12/09/2022       Please note orders entered on site at facility after discussion with appropriate facility nursing/therapy/ / nutritional staff. Current longstanding medical problems and acute medical issues addressed with staff. Available data and data elements in on site paper chart reviewed and analyzed. Current external consultant notes reviewed in on site chart. Ordered laboratory testing and imaging will be reviewed when available.

## 2023-03-05 DIAGNOSIS — I10 ESSENTIAL HYPERTENSION: ICD-10-CM

## 2023-03-06 RX ORDER — METOPROLOL SUCCINATE 25 MG/1
TABLET, EXTENDED RELEASE ORAL
Qty: 90 TABLET | Refills: 1 | OUTPATIENT
Start: 2023-03-06

## 2023-03-06 RX ORDER — INSULIN LISPRO 100 [IU]/ML
INJECTION, SOLUTION INTRAVENOUS; SUBCUTANEOUS
Qty: 10 ML | OUTPATIENT
Start: 2023-03-06

## 2023-03-14 LAB
ANION GAP IN SER/PLAS: 13 MMOL/L (ref 10–20)
BASOPHILS (10*3/UL) IN BLOOD BY AUTOMATED COUNT: 0.03 X10E9/L (ref 0–0.1)
BASOPHILS/100 LEUKOCYTES IN BLOOD BY AUTOMATED COUNT: 0.3 % (ref 0–2)
CALCIUM (MG/DL) IN SER/PLAS: 8.8 MG/DL (ref 8.6–10.3)
CARBON DIOXIDE, TOTAL (MMOL/L) IN SER/PLAS: 25 MMOL/L (ref 21–32)
CHLORIDE (MMOL/L) IN SER/PLAS: 102 MMOL/L (ref 98–107)
CREATININE (MG/DL) IN SER/PLAS: 0.92 MG/DL (ref 0.5–1.3)
EOSINOPHILS (10*3/UL) IN BLOOD BY AUTOMATED COUNT: 0.15 X10E9/L (ref 0–0.7)
EOSINOPHILS/100 LEUKOCYTES IN BLOOD BY AUTOMATED COUNT: 1.6 % (ref 0–6)
ERYTHROCYTE DISTRIBUTION WIDTH (RATIO) BY AUTOMATED COUNT: 15.4 % (ref 11.5–14.5)
ERYTHROCYTE MEAN CORPUSCULAR HEMOGLOBIN CONCENTRATION (G/DL) BY AUTOMATED: 32 G/DL (ref 32–36)
ERYTHROCYTE MEAN CORPUSCULAR VOLUME (FL) BY AUTOMATED COUNT: 87 FL (ref 80–100)
ERYTHROCYTES (10*6/UL) IN BLOOD BY AUTOMATED COUNT: 3.51 X10E12/L (ref 4.5–5.9)
GFR MALE: >90 ML/MIN/1.73M2
GLUCOSE (MG/DL) IN SER/PLAS: 78 MG/DL (ref 74–99)
HEMATOCRIT (%) IN BLOOD BY AUTOMATED COUNT: 30.6 % (ref 41–52)
HEMOGLOBIN (G/DL) IN BLOOD: 9.8 G/DL (ref 13.5–17.5)
IMMATURE GRANULOCYTES/100 LEUKOCYTES IN BLOOD BY AUTOMATED COUNT: 0.3 % (ref 0–0.9)
LEUKOCYTES (10*3/UL) IN BLOOD BY AUTOMATED COUNT: 9.3 X10E9/L (ref 4.4–11.3)
LYMPHOCYTES (10*3/UL) IN BLOOD BY AUTOMATED COUNT: 1.79 X10E9/L (ref 1.2–4.8)
LYMPHOCYTES/100 LEUKOCYTES IN BLOOD BY AUTOMATED COUNT: 19.3 % (ref 13–44)
MONOCYTES (10*3/UL) IN BLOOD BY AUTOMATED COUNT: 1.02 X10E9/L (ref 0.1–1)
MONOCYTES/100 LEUKOCYTES IN BLOOD BY AUTOMATED COUNT: 11 % (ref 2–10)
NEUTROPHILS (10*3/UL) IN BLOOD BY AUTOMATED COUNT: 6.24 X10E9/L (ref 1.2–7.7)
NEUTROPHILS/100 LEUKOCYTES IN BLOOD BY AUTOMATED COUNT: 67.5 % (ref 40–80)
NRBC (PER 100 WBCS) BY AUTOMATED COUNT: 0 /100 WBC (ref 0–0)
PLATELETS (10*3/UL) IN BLOOD AUTOMATED COUNT: 233 X10E9/L (ref 150–450)
POTASSIUM (MMOL/L) IN SER/PLAS: 4.7 MMOL/L (ref 3.5–5.3)
SODIUM (MMOL/L) IN SER/PLAS: 135 MMOL/L (ref 136–145)
UREA NITROGEN (MG/DL) IN SER/PLAS: 33 MG/DL (ref 6–23)

## 2023-03-25 DIAGNOSIS — I25.10 ATHEROSCLEROTIC HEART DISEASE OF NATIVE CORONARY ARTERY WITHOUT ANGINA PECTORIS: ICD-10-CM

## 2023-03-25 DIAGNOSIS — I10 ESSENTIAL (PRIMARY) HYPERTENSION: ICD-10-CM

## 2023-03-27 RX ORDER — ISOSORBIDE MONONITRATE 30 MG/1
TABLET, EXTENDED RELEASE ORAL
Qty: 90 TABLET | Refills: 0 | Status: SHIPPED | OUTPATIENT
Start: 2023-03-27

## 2023-03-27 RX ORDER — LOSARTAN POTASSIUM 25 MG/1
TABLET ORAL
Qty: 90 TABLET | Refills: 0 | Status: SHIPPED | OUTPATIENT
Start: 2023-03-27

## 2023-06-22 LAB — AMMONIA (UMOL/L) IN PLASMA: 30 UMOL/L

## 2024-02-03 ENCOUNTER — APPOINTMENT (OUTPATIENT)
Dept: RADIOLOGY | Facility: HOSPITAL | Age: 69
End: 2024-02-03
Payer: COMMERCIAL

## 2024-02-03 ENCOUNTER — HOSPITAL ENCOUNTER (EMERGENCY)
Facility: HOSPITAL | Age: 69
Discharge: NURSING, REHAB OR LONG-TERM CARE | End: 2024-02-04
Attending: EMERGENCY MEDICINE
Payer: COMMERCIAL

## 2024-02-03 DIAGNOSIS — R33.9 URINARY RETENTION: ICD-10-CM

## 2024-02-03 DIAGNOSIS — N39.0 URINARY TRACT INFECTION ASSOCIATED WITH INDWELLING URETHRAL CATHETER, INITIAL ENCOUNTER (CMS-HCC): Primary | ICD-10-CM

## 2024-02-03 DIAGNOSIS — T83.511A URINARY TRACT INFECTION ASSOCIATED WITH INDWELLING URETHRAL CATHETER, INITIAL ENCOUNTER (CMS-HCC): Primary | ICD-10-CM

## 2024-02-03 PROCEDURE — 83880 ASSAY OF NATRIURETIC PEPTIDE: CPT | Performed by: EMERGENCY MEDICINE

## 2024-02-03 PROCEDURE — 51798 US URINE CAPACITY MEASURE: CPT

## 2024-02-03 PROCEDURE — 99284 EMERGENCY DEPT VISIT MOD MDM: CPT | Performed by: EMERGENCY MEDICINE

## 2024-02-03 PROCEDURE — 84484 ASSAY OF TROPONIN QUANT: CPT | Performed by: EMERGENCY MEDICINE

## 2024-02-03 PROCEDURE — 36415 COLL VENOUS BLD VENIPUNCTURE: CPT | Performed by: EMERGENCY MEDICINE

## 2024-02-03 PROCEDURE — 86901 BLOOD TYPING SEROLOGIC RH(D): CPT | Performed by: EMERGENCY MEDICINE

## 2024-02-03 PROCEDURE — 99284 EMERGENCY DEPT VISIT MOD MDM: CPT | Mod: 25 | Performed by: EMERGENCY MEDICINE

## 2024-02-03 PROCEDURE — 87637 SARSCOV2&INF A&B&RSV AMP PRB: CPT | Performed by: EMERGENCY MEDICINE

## 2024-02-03 PROCEDURE — 85025 COMPLETE CBC W/AUTO DIFF WBC: CPT | Performed by: EMERGENCY MEDICINE

## 2024-02-03 PROCEDURE — 85610 PROTHROMBIN TIME: CPT | Performed by: EMERGENCY MEDICINE

## 2024-02-03 PROCEDURE — 96365 THER/PROPH/DIAG IV INF INIT: CPT

## 2024-02-03 PROCEDURE — 83690 ASSAY OF LIPASE: CPT | Performed by: EMERGENCY MEDICINE

## 2024-02-03 PROCEDURE — 83605 ASSAY OF LACTIC ACID: CPT | Performed by: EMERGENCY MEDICINE

## 2024-02-03 PROCEDURE — 84075 ASSAY ALKALINE PHOSPHATASE: CPT | Performed by: EMERGENCY MEDICINE

## 2024-02-03 PROCEDURE — 71045 X-RAY EXAM CHEST 1 VIEW: CPT

## 2024-02-03 ASSESSMENT — PAIN DESCRIPTION - PAIN TYPE: TYPE: ACUTE PAIN

## 2024-02-03 ASSESSMENT — PAIN DESCRIPTION - PROGRESSION: CLINICAL_PROGRESSION: GRADUALLY WORSENING

## 2024-02-03 ASSESSMENT — PAIN DESCRIPTION - ORIENTATION: ORIENTATION: LOWER

## 2024-02-03 ASSESSMENT — PAIN DESCRIPTION - FREQUENCY: FREQUENCY: CONSTANT/CONTINUOUS

## 2024-02-03 ASSESSMENT — PAIN DESCRIPTION - LOCATION: LOCATION: ABDOMEN

## 2024-02-03 ASSESSMENT — PAIN SCALES - GENERAL: PAINLEVEL_OUTOF10: 10 - WORST POSSIBLE PAIN

## 2024-02-03 ASSESSMENT — PAIN - FUNCTIONAL ASSESSMENT: PAIN_FUNCTIONAL_ASSESSMENT: 0-10

## 2024-02-03 ASSESSMENT — PAIN DESCRIPTION - ONSET: ONSET: ONGOING

## 2024-02-04 ENCOUNTER — APPOINTMENT (OUTPATIENT)
Dept: CARDIOLOGY | Facility: HOSPITAL | Age: 69
End: 2024-02-04
Payer: COMMERCIAL

## 2024-02-04 VITALS
DIASTOLIC BLOOD PRESSURE: 62 MMHG | WEIGHT: 129.41 LBS | HEART RATE: 72 BPM | RESPIRATION RATE: 16 BRPM | BODY MASS INDEX: 21.56 KG/M2 | SYSTOLIC BLOOD PRESSURE: 117 MMHG | TEMPERATURE: 98.1 F | HEIGHT: 65 IN | OXYGEN SATURATION: 99 %

## 2024-02-04 LAB
ABO GROUP (TYPE) IN BLOOD: NORMAL
ALBUMIN SERPL BCP-MCNC: 3.1 G/DL (ref 3.4–5)
ALP SERPL-CCNC: 55 U/L (ref 33–136)
ALT SERPL W P-5'-P-CCNC: 11 U/L (ref 10–52)
ANION GAP SERPL CALC-SCNC: 13 MMOL/L (ref 10–20)
ANTIBODY SCREEN: NORMAL
APPEARANCE UR: ABNORMAL
AST SERPL W P-5'-P-CCNC: 14 U/L (ref 9–39)
BACTERIA #/AREA URNS AUTO: ABNORMAL /HPF
BASOPHILS # BLD AUTO: 0.02 X10*3/UL (ref 0–0.1)
BASOPHILS NFR BLD AUTO: 0.2 %
BILIRUB SERPL-MCNC: 0.5 MG/DL (ref 0–1.2)
BILIRUB UR STRIP.AUTO-MCNC: NEGATIVE MG/DL
BNP SERPL-MCNC: 66 PG/ML (ref 0–99)
BUN SERPL-MCNC: 24 MG/DL (ref 6–23)
CALCIUM SERPL-MCNC: 9.3 MG/DL (ref 8.6–10.3)
CARDIAC TROPONIN I PNL SERPL HS: 14 NG/L (ref 0–20)
CARDIAC TROPONIN I PNL SERPL HS: 14 NG/L (ref 0–20)
CHLORIDE SERPL-SCNC: 100 MMOL/L (ref 98–107)
CO2 SERPL-SCNC: 25 MMOL/L (ref 21–32)
COLOR UR: YELLOW
CREAT SERPL-MCNC: 0.84 MG/DL (ref 0.5–1.3)
EGFRCR SERPLBLD CKD-EPI 2021: >90 ML/MIN/1.73M*2
EOSINOPHIL # BLD AUTO: 0.21 X10*3/UL (ref 0–0.7)
EOSINOPHIL NFR BLD AUTO: 1.9 %
ERYTHROCYTE [DISTWIDTH] IN BLOOD BY AUTOMATED COUNT: 13.1 % (ref 11.5–14.5)
FLUAV RNA RESP QL NAA+PROBE: NOT DETECTED
FLUBV RNA RESP QL NAA+PROBE: NOT DETECTED
GLUCOSE SERPL-MCNC: 238 MG/DL (ref 74–99)
GLUCOSE UR STRIP.AUTO-MCNC: NEGATIVE MG/DL
HCT VFR BLD AUTO: 36.8 % (ref 41–52)
HGB BLD-MCNC: 12.5 G/DL (ref 13.5–17.5)
HOLD SPECIMEN: NORMAL
IMM GRANULOCYTES # BLD AUTO: 0.01 X10*3/UL (ref 0–0.7)
IMM GRANULOCYTES NFR BLD AUTO: 0.1 % (ref 0–0.9)
INR PPP: 0.9 (ref 0.9–1.1)
KETONES UR STRIP.AUTO-MCNC: NEGATIVE MG/DL
LACTATE SERPL-SCNC: 2.2 MMOL/L (ref 0.4–2)
LACTATE SERPL-SCNC: 2.8 MMOL/L (ref 0.4–2)
LEUKOCYTE ESTERASE UR QL STRIP.AUTO: ABNORMAL
LIPASE SERPL-CCNC: 9 U/L (ref 9–82)
LYMPHOCYTES # BLD AUTO: 3.71 X10*3/UL (ref 1.2–4.8)
LYMPHOCYTES NFR BLD AUTO: 33.8 %
MCH RBC QN AUTO: 31.6 PG (ref 26–34)
MCHC RBC AUTO-ENTMCNC: 34 G/DL (ref 32–36)
MCV RBC AUTO: 93 FL (ref 80–100)
MONOCYTES # BLD AUTO: 0.84 X10*3/UL (ref 0.1–1)
MONOCYTES NFR BLD AUTO: 7.6 %
NEUTROPHILS # BLD AUTO: 6.2 X10*3/UL (ref 1.2–7.7)
NEUTROPHILS NFR BLD AUTO: 56.4 %
NITRITE UR QL STRIP.AUTO: NEGATIVE
NRBC BLD-RTO: 0 /100 WBCS (ref 0–0)
PH UR STRIP.AUTO: 7 [PH]
PLATELET # BLD AUTO: 222 X10*3/UL (ref 150–450)
POTASSIUM SERPL-SCNC: 3.7 MMOL/L (ref 3.5–5.3)
PROT SERPL-MCNC: 6.2 G/DL (ref 6.4–8.2)
PROT UR STRIP.AUTO-MCNC: ABNORMAL MG/DL
PROTHROMBIN TIME: 10.4 SECONDS (ref 9.8–12.8)
RBC # BLD AUTO: 3.96 X10*6/UL (ref 4.5–5.9)
RBC # UR STRIP.AUTO: ABNORMAL /UL
RBC #/AREA URNS AUTO: ABNORMAL /HPF
RH FACTOR (ANTIGEN D): NORMAL
RSV RNA RESP QL NAA+PROBE: NOT DETECTED
SARS-COV-2 RNA RESP QL NAA+PROBE: NOT DETECTED
SODIUM SERPL-SCNC: 134 MMOL/L (ref 136–145)
SP GR UR STRIP.AUTO: 1.01
SQUAMOUS #/AREA URNS AUTO: ABNORMAL /HPF
UROBILINOGEN UR STRIP.AUTO-MCNC: <2 MG/DL
WBC # BLD AUTO: 11 X10*3/UL (ref 4.4–11.3)
WBC #/AREA URNS AUTO: >50 /HPF

## 2024-02-04 PROCEDURE — 83605 ASSAY OF LACTIC ACID: CPT | Performed by: EMERGENCY MEDICINE

## 2024-02-04 PROCEDURE — 36415 COLL VENOUS BLD VENIPUNCTURE: CPT | Performed by: EMERGENCY MEDICINE

## 2024-02-04 PROCEDURE — 81001 URINALYSIS AUTO W/SCOPE: CPT | Performed by: EMERGENCY MEDICINE

## 2024-02-04 PROCEDURE — 93005 ELECTROCARDIOGRAM TRACING: CPT

## 2024-02-04 PROCEDURE — 71045 X-RAY EXAM CHEST 1 VIEW: CPT | Performed by: RADIOLOGY

## 2024-02-04 PROCEDURE — 87086 URINE CULTURE/COLONY COUNT: CPT | Mod: STJLAB | Performed by: EMERGENCY MEDICINE

## 2024-02-04 PROCEDURE — 84484 ASSAY OF TROPONIN QUANT: CPT | Performed by: EMERGENCY MEDICINE

## 2024-02-04 PROCEDURE — 2500000004 HC RX 250 GENERAL PHARMACY W/ HCPCS (ALT 636 FOR OP/ED)

## 2024-02-04 RX ORDER — LEVOFLOXACIN 750 MG/1
750 TABLET ORAL DAILY
Qty: 5 TABLET | Refills: 0 | Status: SHIPPED | OUTPATIENT
Start: 2024-02-04 | End: 2024-02-09

## 2024-02-04 RX ORDER — LEVOFLOXACIN 5 MG/ML
500 INJECTION, SOLUTION INTRAVENOUS ONCE
Status: COMPLETED | OUTPATIENT
Start: 2024-02-04 | End: 2024-02-04

## 2024-02-04 RX ADMIN — LEVOFLOXACIN 500 MG: 500 INJECTION, SOLUTION INTRAVENOUS at 01:47

## 2024-02-04 ASSESSMENT — COLUMBIA-SUICIDE SEVERITY RATING SCALE - C-SSRS
6. HAVE YOU EVER DONE ANYTHING, STARTED TO DO ANYTHING, OR PREPARED TO DO ANYTHING TO END YOUR LIFE?: NO
1. IN THE PAST MONTH, HAVE YOU WISHED YOU WERE DEAD OR WISHED YOU COULD GO TO SLEEP AND NOT WAKE UP?: NO
2. HAVE YOU ACTUALLY HAD ANY THOUGHTS OF KILLING YOURSELF?: NO

## 2024-02-04 ASSESSMENT — PAIN SCALES - GENERAL
PAINLEVEL_OUTOF10: 0 - NO PAIN
PAINLEVEL_OUTOF10: 10 - WORST POSSIBLE PAIN
PAINLEVEL_OUTOF10: 0 - NO PAIN

## 2024-02-04 ASSESSMENT — LIFESTYLE VARIABLES
EVER HAD A DRINK FIRST THING IN THE MORNING TO STEADY YOUR NERVES TO GET RID OF A HANGOVER: NO
HAVE PEOPLE ANNOYED YOU BY CRITICIZING YOUR DRINKING: NO
EVER FELT BAD OR GUILTY ABOUT YOUR DRINKING: NO
HAVE YOU EVER FELT YOU SHOULD CUT DOWN ON YOUR DRINKING: NO

## 2024-02-04 NOTE — ED PROVIDER NOTES
EMERGENCY DEPARTMENT ENCOUNTER      Pt Name: Rudy Joseph  MRN: [id number]  Birthdate 1955  Date of evaluation: 2/3/2024  Provider: Octavio Lenz DO    CHIEF COMPLAINT       Chief Complaint   Patient presents with    Chest Pain     Bladder pain and abdominal pain x 10. A&Ox3. Pt arrived with nguyen catheter in place.         HISTORY OF PRESENT ILLNESS    Patient is a 68-year-old male who comes to the emergency department due to abdominal pain and bladder pain.  The patient states that his bladder pain started earlier today.  The patient has a catheter in, but the catheter has not been draining urine.  Later in the day the patient also developed chest pain with shortness of breath.  He states the chest pain and shortness of breath were created by the pain in his bladder and abdomen.   He has a baseline chest pain but believes that the stress from the bladder pain is worse.  The patient is not on oxygen at his nursing home where he comes from.  The patient has a history of 3 heart attacks and diabetes mellitus.  The patient denies fever, chills, diaphoresis, recent weakness, sore throat, nasal drainage, cough, or headache.  The patient endorses some vision changes due to cataracts, chest pain, shortness of breath, and nausea.  The patient denies any significant past surgical history.      History provided by:  Patient  History limited by:  Acuity of condition   used: No        Nursing Notes were reviewed.    PAST MEDICAL HISTORY     Past Medical History:   Diagnosis Date    Atherosclerotic heart disease of native coronary artery without angina pectoris 08/01/2022    CAD (coronary artery disease)    Combined forms of age-related cataract, bilateral 07/25/2022    Combined form of age-related cataract, both eyes    Essential (primary) hypertension 06/13/2022    HTN (hypertension)    Type 2 diabetes mellitus without complications (CMS/[facility]) 08/29/2022    Type 2 diabetes mellitus    Type 2  diabetes mellitus without complications (CMS/HCC) 08/29/2022    Type 2 diabetes mellitus    Type 2 diabetes mellitus without complications (CMS/HCC) 08/29/2022    Type 2 diabetes mellitus    Type 2 diabetes mellitus without complications (CMS/HCC) 08/29/2022    Type 2 diabetes mellitus    Type 2 diabetes mellitus without complications (CMS/HCC) 08/29/2022    Type 2 diabetes mellitus    Type 2 diabetes mellitus without complications (CMS/HCC) 08/29/2022    Type 2 diabetes mellitus         SURGICAL HISTORY       Past Surgical History:   Procedure Laterality Date    CT ANGIO NECK  7/3/2019    CT NECK ANGIO W AND WO IV CONTRAST 7/3/2019 [facility]ICAL [facility]GAC[facility]    CT HEAD ANGIO W AND WO IV CONTRAST  7/3/2019    CT HEAD ANGIO W AND WO IV CONTRAST 7/3/2019 [facility] CLINICAL LEGACY    IR CVC TUNNELED  4/7/2023    IR CVC TUNNELED STJ ANGIO    OTHER SURGICAL HISTORY  03/10/2021    Arterial stent placement    OTHER SURGICAL HISTORY  03/10/2021    Appendectomy    US GUIDED ABSCESS DRAIN  4/5/2023    US GUIDED ABSCESS DRAIN STJ US         CURRENT MEDICATIONS       Previous Medications    ISOSORBIDE MONONITRATE ER (IMDUR) 30 MG 24 HR TABLET    TAKE 1 TABLET BY MOUTH EVERY DAY    LOSARTAN (COZAAR) 25 MG TABLET    TAKE 1 TABLET BY MOUTH EVERY DAY       ALLERGIES     Patient has no known allergies.    FAMILY HISTORY     No family history on file.       SOCIAL HISTORY       Social History     Socioeconomic History    Marital status: Single     Spouse name: None    Number of children: None    Years of education: None    Highest education level: None   Occupational History    None   Tobacco Use    Smoking status: Never    Smokeless tobacco: Never   Substance and Sexual Activity    Alcohol use: None    Drug use: Never    Sexual activity: None   Other Topics Concern    None   Social History Narrative    None     Social Determinants of Health     Financial Resource Strain: Not on file   Food Insecurity: Not on file   Transportation Needs: Not on  file   Physical Activity: Not on file   Stress: Not on file   Social Connections: Not on file   Intimate Partner Violence: Not on file   Housing Stability: Not on file       SCREENINGS                        PHYSICAL EXAM    (up to 7 for level 4, 8 or more for level 5)     ED Triage Vitals   Temperature Heart Rate Respirations BP   02/03/24 2344 02/03/24 2344 02/03/24 2344 02/03/24 2344   36.3 °C (97.3 °F) (!) 104 20 159/90      Pulse Ox Temp Source Heart Rate Source Patient Position   02/03/24 2344 02/03/24 2344 02/03/24 2344 02/04/24 0045   97 % Temporal Monitor Lying      BP Location FiO2 (%)     -- --             Physical Exam  Constitutional:       General: He is in acute distress.      Appearance: He is normal weight. He is ill-appearing. He is not toxic-appearing or diaphoretic.   Eyes:      Extraocular Movements: Extraocular movements intact.   Cardiovascular:      Rate and Rhythm: Normal rate and regular rhythm.      Heart sounds: Normal heart sounds. Heart sounds not distant. No murmur heard.     No systolic murmur is present.      No diastolic murmur is present.      No friction rub. No gallop. No S3 or S4 sounds.   Pulmonary:      Effort: Pulmonary effort is normal.      Breath sounds: Normal breath sounds. No decreased breath sounds, wheezing, rhonchi or rales.   Chest:      Chest wall: Deformity present.      Comments: The patient is very bony and very skinny.  The skin below the rib cage noticeably drops below the rib cage.  Musculoskeletal:      Cervical back: Normal range of motion and neck supple.      Right lower leg: No tenderness. No edema.      Left lower leg: No tenderness. No edema.   Skin:     Coloration: Skin is not cyanotic or pale.      Findings: No ecchymosis, erythema or rash.      Nails: There is clubbing (Clubbing of the fingernails.).   Neurological:      General: No focal deficit present.      Mental Status: He is alert.   Psychiatric:         Mood and Affect: Mood is anxious.          Behavior: Behavior is agitated.          DIAGNOSTIC RESULTS     LABS:  Labs Reviewed   CBC WITH AUTO DIFFERENTIAL - Abnormal       Result Value    WBC 11.0      nRBC 0.0      RBC 3.96 (*)     Hemoglobin 12.5 (*)     Hematocrit 36.8 (*)     MCV 93      MCH 31.6      MCHC 34.0      RDW 13.1      Platelets 222      Neutrophils % 56.4      Immature Granulocytes %, Automated 0.1      Lymphocytes % 33.8      Monocytes % 7.6      Eosinophils % 1.9      Basophils % 0.2      Neutrophils Absolute 6.20      Immature Granulocytes Absolute, Automated 0.01      Lymphocytes Absolute 3.71      Monocytes Absolute 0.84      Eosinophils Absolute 0.21      Basophils Absolute 0.02     COMPREHENSIVE METABOLIC PANEL - Abnormal    Glucose 238 (*)     Sodium 134 (*)     Potassium 3.7      Chloride 100      Bicarbonate 25      Anion Gap 13      Urea Nitrogen 24 (*)     Creatinine 0.84      eGFR >90      Calcium 9.3      Albumin 3.1 (*)     Alkaline Phosphatase 55      Total Protein 6.2 (*)     AST 14      Bilirubin, Total 0.5      ALT 11     URINALYSIS WITH REFLEX CULTURE AND MICROSCOPIC - Abnormal    Color, Urine Yellow      Appearance, Urine Hazy (*)     Specific Gravity, Urine 1.010      pH, Urine 7.0      Protein, Urine >=500 (3+) (*)     Glucose, Urine NEGATIVE      Blood, Urine SMALL (1+) (*)     Ketones, Urine NEGATIVE      Bilirubin, Urine NEGATIVE      Urobilinogen, Urine <2.0      Nitrite, Urine NEGATIVE      Leukocyte Esterase, Urine LARGE (3+) (*)    LACTATE - Abnormal    Lactate 2.8 (*)     Narrative:     Venipuncture immediately after or during the administration of Metamizole may lead to falsely low results. Testing should be performed immediately  prior to Metamizole dosing.   LACTATE - Abnormal    Lactate 2.2 (*)     Narrative:     Venipuncture immediately after or during the administration of Metamizole may lead to falsely low results. Testing should be performed immediately  prior to Metamizole dosing.   MICROSCOPIC  ONLY, URINE - Abnormal    WBC, Urine >50 (*)     RBC, Urine 6-10 (*)     Squamous Epithelial Cells, Urine 1-9 (SPARSE)      Bacteria, Urine 3+ (*)    B-TYPE NATRIURETIC PEPTIDE - Normal    BNP 66      Narrative:        <100 pg/mL - Heart failure unlikely  100-299 pg/mL - Intermediate probability of acute heart                  failure exacerbation. Correlate with clinical                  context and patient history.    >=300 pg/mL - Heart Failure likely. Correlate with clinical                  context and patient history.    BNP testing is performed using different testing methodology at [facility] than at other [facility]. Direct result comparisons should only be made within the same method.      TROPONIN I, HIGH SENSITIVITY - Normal    Troponin I, High Sensitivity 14      Narrative:     Less than 99th percentile of normal range cutoff-  Female and children under 18 years old <14 ng/L; Male <21 ng/L: Negative  Repeat testing should be performed if clinically indicated.     Female and children under 18 years old 14-50 ng/L; Male 21-50 ng/L:  Consistent with possible cardiac damage and possible increased clinical   risk. Serial measurements may help to assess extent of myocardial damage.     >50 ng/L: Consistent with cardiac damage, increased clinical risk and  myocardial infarction. Serial measurements may help assess extent of   myocardial damage.      NOTE: Children less than 1 year old may have higher baseline troponin   levels and results should be interpreted in conjunction with the overall   clinical context.     NOTE: Troponin I testing is performed using a different   testing methodology at [facility] than at other   [facility]. Direct result comparisons should only   be made within the same method.   PROTIME-INR - Normal    Protime 10.4      INR 0.9     SARS-COV-2 AND INFLUENZA A/B PCR - Normal    Flu A Result Not Detected      Flu B Result Not Detected       Coronavirus 2019, PCR Not Detected      Narrative:     This assay has received FDA Emergency Use Authorization (EUA) and  is only authorized for the duration of time that circumstances exist to justify the authorization of the emergency use of in vitro diagnostic tests for the detection of SARS-CoV-2 virus and/or diagnosis of COVID-19 infection under section 564(b)(1) of the Act, 21 U.S.C. 360bbb-3(b)(1). Testing for SARS-CoV-2 is only recommended for patients who meet current clinical and/or epidemiological criteria as defined by federal, state, or local public health directives. This assay is an in vitro diagnostic nucleic acid amplification test for the qualitative detection of SARS-CoV-2, Influenza A, and Influenza B from nasopharyngeal specimens and has been validated for use at [facility]. Negative results do not preclude COVID-19 infections or Influenza A/B infections, and should not be used as the sole basis for diagnosis, treatment, or other management decisions. If Influenza A/B and RSV PCR results are negative, testing for Parainfluenza virus, Adenovirus and Metapneumovirus is routinely performed for [facility] pediatric oncology and intensive care inpatients, and is available on other patients by placing an add-on request.    RSV PCR - Normal    RSV PCR Not Detected      Narrative:     This assay is an FDA-cleared, in vitro diagnostic nucleic acid amplification test for the detection of RSV from nasopharyngeal specimens, and has been validated for use at [facility]. Negative results do not preclude RSV infections, and should not be used as the sole basis for diagnosis, treatment, or other management decisions. If Influenza A/B and RSV PCR results are negative, testing for Parainfluenza virus, Adenovirus and Metapneumovirus is routinely performed for pediatric oncology and intensive care inpatients at [facility], and is available on other patients by placing an add-on  request.       LIPASE - Normal    Lipase 9      Narrative:     Venipuncture immediately after or during the administration of Metamizole may lead to falsely low results. Testing should be performed immediately prior to Metamizole dosing.   TROPONIN I, HIGH SENSITIVITY - Normal    Troponin I, High Sensitivity 14      Narrative:     Less than 99th percentile of normal range cutoff-  Female and children under 18 years old <14 ng/L; Male <21 ng/L: Negative  Repeat testing should be performed if clinically indicated.     Female and children under 18 years old 14-50 ng/L; Male 21-50 ng/L:  Consistent with possible cardiac damage and possible increased clinical   risk. Serial measurements may help to assess extent of myocardial damage.     >50 ng/L: Consistent with cardiac damage, increased clinical risk and  myocardial infarction. Serial measurements may help assess extent of   myocardial damage.      NOTE: Children less than 1 year old may have higher baseline troponin   levels and results should be interpreted in conjunction with the overall   clinical context.     NOTE: Troponin I testing is performed using a different   testing methodology at [facility] than at other   [facility]. Direct result comparisons should only   be made within the same method.   URINE CULTURE   TYPE AND SCREEN    ABO TYPE AB      Rh TYPE POS      ANTIBODY SCREEN NEG     URINALYSIS WITH REFLEX CULTURE AND MICROSCOPIC    Narrative:     The following orders were created for panel order Urinalysis with Reflex Culture and Microscopic.  Procedure                               Abnormality         Status                     ---------                               -----------         ------                     Urinalysis with Reflex C...[[id number]]  Abnormal            Final result               Extra Urine Gray Tube[[id number]]                            In process                   Please view results for these tests on the  individual orders.   EXTRA URINE GRAY TUBE       All other labs were within normal range or not returned as of this dictation.    Imaging  XR chest 1 view   Final Result   1.  No focal consolidation present.                  MACRO:   None        Signed by: Yelena Wilson 2/4/2024 1:57 AM   Dictation workstation:   [id number]           Procedures  Procedures     EMERGENCY DEPARTMENT COURSE/MDM:     ED Course as of 02/04/24 0409   Sun Feb 04, 2024 0227 The patient has a history of UTI with Klebsiella oxalate sensitive to Levaquin as well as Klebsiella pneumonia sensitive to Levaquin.  He will be placed on 750 of Levaquin given catheter associated UTI with obstruction, for 5 days.  Cultures are pending otherwise.    As far as the patient's chest pain, there is a language barrier.  He states his chest was not hurting when he got here.  Now that the catheter has been decompressed, it was more catheter abdominal pain from the spasming and that has all resolved.       The patient has 2 negative troponin levels, no acute changes in the EKG and he has had no chest pain while here.   He is sleeping comfortably.   He states he always has a little bit of chest pain, that is not new, what brought him in here was the acute abdominal pain and bladder spasm from not being able to urinate.       Patient produces 800 ml of urine from the bladder.    [GR]      ED Course User Index  [GR] Octavio Lenz DO         Diagnoses as of 02/04/24 0409   Urinary tract infection associated with indwelling urethral catheter, initial encounter (CMS/[facility])   Urinary retention        Medical Decision Making  The patient received a bladder scan due to the patient complaining of bladder pain. 650 cc of urine was observed in the patient's bladder prior to removal of the catheter. The patient had his old catheter removed because it was causing pain and not draining urine.  The patient immediately started to urinate upon removal of his old  catheter.  A urine sample was collected from this urine for urine analysis.  A new catheter was placed and the patient.  The patient feels much better with the new catheter than he did with the old catheter which was causing him significant pain.  The patient's urine analysis revealed the patient has a UTI.  The bacteria appears to be Klebsiella.  From past cultures, the Klebsiella appears to be suspected Boal to Levaquin.  IV Levaquin 500 mg was given to the patient in the emergency department.  The patient's vitals were all within normal limits and there is no suspicion of sepsis.  The patient's chest pain workup which includes a ECG, chest x-ray, troponins, CBC, and CMP did not show any significant acute abnormalities. The patient is not using any supportive oxygen and does not appear to be short of breath.  The patient was discharged back to his nursing facility on oral Levaquin 750 mg once daily for 5 days to treat his urinary tract infection.        Patient and or family in agreement and understanding of treatment plan.  All questions answered.      I reviewed the case with the attending ED physician. The attending ED physician agrees with the plan. Patient and/or patient´s representative was counseled regarding labs, imaging, likely diagnosis, and plan. All questions were answered.    ED Medications administered this visit:    Medications   levoFLOXacin (Levaquin)  mg (0 mg intravenous Stopped 2/4/24 0258)       New Prescriptions from this visit:    New Prescriptions    LEVOFLOXACIN (LEVAQUIN) 750 MG TABLET    Take 1 tablet (750 mg) by mouth once daily for 5 days.       Follow-up:  Luz Marina Pagan MD  [address]  [place] [id number] [phone]    In 2 days          Final Impression:   1. Urinary tract infection associated with indwelling urethral catheter, initial encounter (CMS/[facility])    2. Urinary retention          (Please note that portions of this note were completed with a  voice recognition program.  Efforts were made to edit the dictations but occasionally words are mis-transcribed.)     Estephania Ryan MD  Resident  02/04/24 3837      The patient was seen by the resident/fellow.  I have personally performed a substantive portion of the encounter.  I have seen and examined the patient; agree with the workup, evaluation, MDM, management and diagnosis.  The care plan has been discussed with the resident/fellow; I have reviewed the resident/fellow’s note and agree with the documented findings with the exception/addition of the following:    Now that the patient's bladder has been decompressed, he feels much better.  He states that the chest pain and shortness of breath he is experiencing are totally gone, and they are only mild in nature compared to the abdominal pain and bladder spasm.  He states the abdominal pain and bladder spasms are actually causing him so much stress that was given him chest pain and shortness of breath otherwise he feels fine now.  His EKG reveals no acute changes, his troponins were negative x 2, and has had no further episodes since changing his catheter decompressing his bladder.  At this time, the patient does have a UTI and has grown drug-resistant Klebsiella in the past therefore was placed on Levaquin.  As his cardiac evaluation in the ER has been negative, he will be discharged from the emergency room to go back to the nursing facility.  He is 100% agreeable with this plan and does not want to stay in the hospital, he states that the chest pain and shortness of breath he experienced is a chronic thing, and again only because he was having so much pain in the bladder otherwise he feels fine.  He does not want to stay in the hospital and is agreeable through shared decision making with discharge back to the nursing facility.     Octavio Lenz,   02/04/24 [id number]

## 2024-02-04 NOTE — DISCHARGE INSTRUCTIONS
Thank you for visiting the [facility] emergency department. Please take your Levaquin as prescribed for resolution of your urinary tract infection. Please follow-up with your primary care provider for resolution of your urinary tract infection and other symptoms.    Please come back to the emergency department if you experience sudden onset chest pain, chest pain with difficulty breathing, or swelling in your lower legs, fevers, nausea and vomiting, flank pain as it can be signs of cardiac disease or sepsis.  Otherwise please continue Levaquin as directed.

## 2024-02-06 LAB — BACTERIA UR CULT: ABNORMAL

## 2024-02-07 ENCOUNTER — TELEPHONE (OUTPATIENT)
Dept: PHARMACY | Facility: HOSPITAL | Age: 69
End: 2024-02-07
Payer: COMMERCIAL

## 2024-02-07 NOTE — PROGRESS NOTES
EDPD Note: Lab/Chart Reviewed    Reviewed Rudy Joseph 's chart regarding a contaminated urine culture/result that was taken during their recent emergency room visit. The patient was transferred back to their rehab/LTC facility .Therefore, I have faxed this information to [facility] at fax number [phone] .    No results found for the last 90 days.      No further follow up needed from EDPD Team.     Ady Bansal, PharmD

## 2024-02-08 LAB
ATRIAL RATE: 108 BPM
P AXIS: 78 DEGREES
P OFFSET: 204 MS
P ONSET: 172 MS
PR INTERVAL: 112 MS
Q ONSET: 228 MS
QRS COUNT: 18 BEATS
QRS DURATION: 68 MS
QT INTERVAL: 342 MS
QTC CALCULATION(BAZETT): 458 MS
QTC FREDERICIA: 416 MS
R AXIS: -36 DEGREES
T AXIS: 100 DEGREES
T OFFSET: 399 MS
VENTRICULAR RATE: 108 BPM